# Patient Record
Sex: FEMALE | NOT HISPANIC OR LATINO | Employment: FULL TIME | URBAN - METROPOLITAN AREA
[De-identification: names, ages, dates, MRNs, and addresses within clinical notes are randomized per-mention and may not be internally consistent; named-entity substitution may affect disease eponyms.]

---

## 2017-12-01 ENCOUNTER — OFFICE VISIT (OUTPATIENT)
Dept: FAMILY MEDICINE | Facility: CLINIC | Age: 42
End: 2017-12-01
Payer: COMMERCIAL

## 2017-12-01 VITALS
HEART RATE: 81 BPM | WEIGHT: 190.2 LBS | TEMPERATURE: 98.2 F | HEIGHT: 64 IN | SYSTOLIC BLOOD PRESSURE: 110 MMHG | DIASTOLIC BLOOD PRESSURE: 78 MMHG | OXYGEN SATURATION: 100 % | BODY MASS INDEX: 32.47 KG/M2

## 2017-12-01 DIAGNOSIS — Z00.00 ROUTINE GENERAL MEDICAL EXAMINATION AT A HEALTH CARE FACILITY: Primary | ICD-10-CM

## 2017-12-01 DIAGNOSIS — Z97.5 IUD (INTRAUTERINE DEVICE) IN PLACE: ICD-10-CM

## 2017-12-01 DIAGNOSIS — Z23 NEED FOR PROPHYLACTIC VACCINATION AND INOCULATION AGAINST INFLUENZA: ICD-10-CM

## 2017-12-01 DIAGNOSIS — Z12.31 ENCOUNTER FOR SCREENING MAMMOGRAM FOR BREAST CANCER: ICD-10-CM

## 2017-12-01 DIAGNOSIS — E55.9 VITAMIN D DEFICIENCY: ICD-10-CM

## 2017-12-01 PROCEDURE — 90471 IMMUNIZATION ADMIN: CPT | Performed by: FAMILY MEDICINE

## 2017-12-01 PROCEDURE — 90686 IIV4 VACC NO PRSV 0.5 ML IM: CPT | Performed by: FAMILY MEDICINE

## 2017-12-01 PROCEDURE — 99396 PREV VISIT EST AGE 40-64: CPT | Mod: 25 | Performed by: FAMILY MEDICINE

## 2017-12-01 PROCEDURE — G0145 SCR C/V CYTO,THINLAYER,RESCR: HCPCS | Performed by: FAMILY MEDICINE

## 2017-12-01 PROCEDURE — 87624 HPV HI-RISK TYP POOLED RSLT: CPT | Performed by: FAMILY MEDICINE

## 2017-12-01 RX ORDER — ERGOCALCIFEROL 1.25 MG/1
50000 CAPSULE, LIQUID FILLED ORAL WEEKLY
Qty: 12 CAPSULE | Refills: 1 | Status: SHIPPED | OUTPATIENT
Start: 2017-12-01 | End: 2017-12-01

## 2017-12-01 RX ORDER — COPPER 313.4 MG/1
1 INTRAUTERINE DEVICE INTRAUTERINE ONCE
Qty: 1 EACH | COMMUNITY
Start: 2017-12-01

## 2017-12-01 RX ORDER — ERGOCALCIFEROL 1.25 MG/1
50000 CAPSULE, LIQUID FILLED ORAL
COMMUNITY
End: 2017-12-01

## 2017-12-01 RX ORDER — ERGOCALCIFEROL 1.25 MG/1
50000 CAPSULE, LIQUID FILLED ORAL WEEKLY
Qty: 12 CAPSULE | Refills: 1 | Status: SHIPPED | OUTPATIENT
Start: 2017-12-01 | End: 2020-02-06

## 2017-12-01 NOTE — NURSING NOTE
Chief Complaint   Patient presents with     Physical       Initial There were no vitals taken for this visit. There is no height or weight on file to calculate BMI.  Medication Reconciliation: complete   Lacey Gupta, CMA

## 2017-12-01 NOTE — PROGRESS NOTES
SUBJECTIVE:   CC: Penelope Childress is an 42 year old woman who presents for preventive health visit.     Healthy Habits:    Do you get at least three servings of calcium containing foods daily (dairy, green leafy vegetables, etc.)? yes    Amount of exercise or daily activities, outside of work: 4 day(s) per week    Problems taking medications regularly No    Medication side effects: No    Have you had an eye exam in the past two years? yes    Do you see a dentist twice per year? yes    Do you have sleep apnea, excessive snoring or daytime drowsiness?yes snoring         Today's PHQ-2 Score:   PHQ-2 ( 1999 Pfizer) 12/1/2017   Q1: Little interest or pleasure in doing things 0   Q2: Feeling down, depressed or hopeless 0   PHQ-2 Score 0       Abuse: Current or Past(Physical, Sexual or Emotional)- No  Do you feel safe in your environment - Yes    Social History   Substance Use Topics     Smoking status: Never Smoker     Smokeless tobacco: Never Used     Alcohol use No     The patient does not drink >3 drinks per day nor >7 drinks per week.    Reviewed orders with patient.  Reviewed health maintenance and updated orders accordingly - Yes  Labs reviewed in EPIC  BP Readings from Last 3 Encounters:   12/01/17 110/78    Wt Readings from Last 3 Encounters:   12/01/17 190 lb 3.2 oz (86.3 kg)                  Patient Active Problem List   Diagnosis     Vitamin D deficiency     IUD (intrauterine device) in place     History reviewed. No pertinent surgical history.    Social History   Substance Use Topics     Smoking status: Never Smoker     Smokeless tobacco: Never Used     Alcohol use No     Family History   Problem Relation Age of Onset     DIABETES Mother      DIABETES Father          Current Outpatient Prescriptions   Medication Sig Dispense Refill     vitamin D (ERGOCALCIFEROL) 40030 UNIT capsule Take 1 capsule (50,000 Units) by mouth once a week 12 capsule 1     paragard intrauterine copper 1 each by Intrauterine route once  "for 1 dose 1 each      No Known Allergies        Patient under age 50, mutual decision reflected in health maintenance.        Pertinent mammograms are reviewed under the imaging tab.  History of abnormal Pap smear: NO - age 30- 65 PAP every 3 years recommended    Reviewed and updated as needed this visit by clinical staffTobacco  Allergies  Meds  Problems  Med Hx  Surg Hx  Fam Hx  Soc Hx          Reviewed and updated as needed this visit by Provider  Allergies  Meds  Problems  Med Hx  Surg Hx  Fam Hx              ROS:  C: NEGATIVE for fever, chills, change in weight  I: NEGATIVE for worrisome rashes, moles or lesions  E: NEGATIVE for vision changes or irritation  ENT: NEGATIVE for ear, mouth and throat problems  R: NEGATIVE for significant cough or SOB  B: NEGATIVE for masses, tenderness or discharge  CV: NEGATIVE for chest pain, palpitations or peripheral edema  GI: NEGATIVE for nausea, abdominal pain, heartburn, or change in bowel habits  : NEGATIVE for unusual urinary or vaginal symptoms. Periods are regular.  M: NEGATIVE for significant arthralgias or myalgia  N: NEGATIVE for weakness, dizziness or paresthesias  P: NEGATIVE for changes in mood or affect    OBJECTIVE:   /78 (BP Location: Right arm, Patient Position: Sitting, Cuff Size: Adult Large)  Pulse 81  Temp 98.2  F (36.8  C) (Tympanic)  Ht 5' 4\" (1.626 m)  Wt 190 lb 3.2 oz (86.3 kg)  LMP 11/26/2017  SpO2 100%  BMI 32.65 kg/m2  EXAM:  GENERAL: healthy, alert and no distress  EYES: Eyes grossly normal to inspection, PERRL and conjunctivae and sclerae normal  HENT: ear canals and TM's normal, nose and mouth without ulcers or lesions  NECK: no adenopathy, no asymmetry, masses, or scars and thyroid normal to palpation  RESP: lungs clear to auscultation - no rales, rhonchi or wheezes  BREAST: normal without masses, tenderness or nipple discharge and no palpable axillary masses or adenopathy  CV: regular rate and rhythm, normal S1 S2, " no S3 or S4, no murmur, click or rub, no peripheral edema and peripheral pulses strong  ABDOMEN: soft, nontender, no hepatosplenomegaly, no masses and bowel sounds normal   (female): normal female external genitalia, normal urethral meatus, vaginal mucosa pink, moist, well rugated, and normal cervix/adnexa/uterus without masses or discharge. IUD stings noted at the os.  MS: no gross musculoskeletal defects noted, no edema  SKIN: no suspicious lesions or rashes  NEURO: Normal strength and tone, mentation intact and speech normal  PSYCH: mentation appears normal, affect normal/bright        ASSESSMENT/PLAN:   1. Routine general medical examination at a health care facility  Labs reviewed  -Kidney function (GFR) is normal.  -Sodium is normal.  -Potassium is normal.  -Glucose is slight elevated. ADVISE:: low carbohydrate diet, exercise, try to lose weight (if necessary) and recheck glucose in 12 months.     Your LDL cholesterol is flagged as high. However, this is a cut off only for people with chronic health conditions like diabetes, heart disease. For other individuals the goals for LDL cholesterol are higher, so I am happy with where your LDL is.    ADVICE: The mediterranean diet has been shown to reduce LDL cholesterol and increase HDL cholesterol and has also been shown to reduce the risk of cardiovascular disease, cancer, and Alzheimer's dementia. This diet focuses on healthy fats (monounsatruated and polyunsaturated) such that are found in fish, extra virgin olive oil, nuts, and avocados. It is also high in fruits & vegetables (7 servings per day) and whole grains. Regular exercise is also important in reducing cholesterol. Repeat cholesterol panel should be checked in 1 year again.    -TSH (thyroid stimulating hormone) level is normal which indicates normal thyroid function.  -Hemoglobin is normal.  There is no evidence of anemia.    -Vitamin D level is towards the low normal range and oral supplementation  "should be started.  ADVISE: starting over the counter Vitamin D3  2000 IU - 3 tabs (6000 IU) daily for 6 weeks and then 2000 IU daily to maintain levels.        - TSH with free T4 reflex; Future  - Basic metabolic panel; Future  - Lipid Profile; Future  - Hemoglobin; Future  - Pap imaged thin layer screen with HPV - recommended age 30 - 65 years (select HPV order below)  - HPV High Risk Types DNA Cervical    2. Vitamin D deficiency    - vitamin D (ERGOCALCIFEROL) 28640 UNIT capsule; Take 1 capsule (50,000 Units) by mouth once a week  Dispense: 12 capsule; Refill: 1  - Vitamin D Deficiency; Future    3. IUD (intrauterine device) in place      4. Encounter for screening mammogram for breast cancer    - *MA Screening Digital Bilateral; Future    5. Need for prophylactic vaccination and inoculation against influenza    - FLU VAC, SPLIT VIRUS IM > 3 YO (QUADRIVALENT) [81136]  - Vaccine Administration, Initial [03526]    COUNSELING:   Reviewed preventive health counseling, as reflected in patient instructions       Regular exercise       Healthy diet/nutrition         reports that she has never smoked. She has never used smokeless tobacco.    Estimated body mass index is 32.65 kg/(m^2) as calculated from the following:    Height as of this encounter: 5' 4\" (1.626 m).    Weight as of this encounter: 190 lb 3.2 oz (86.3 kg).   Weight management plan: Discussed healthy diet and exercise guidelines and patient will follow up in 12 months in clinic to re-evaluate.    Counseling Resources:  ATP IV Guidelines  Pooled Cohorts Equation Calculator  Breast Cancer Risk Calculator  FRAX Risk Assessment  ICSI Preventive Guidelines  Dietary Guidelines for Americans, 2010  USDA's MyPlate  ASA Prophylaxis  Lung CA Screening    Laila Galicia MD  [facility] [place]  Injectable Influenza Immunization Documentation    1.  Is the person to be vaccinated sick today?   No    2. Does the person to be vaccinated have an allergy to a " component   of the vaccine?   No  Egg Allergy Algorithm Link    3. Has the person to be vaccinated ever had a serious reaction   to influenza vaccine in the past?   No    4. Has the person to be vaccinated ever had Guillain-Barré syndrome?   No    Form completed by Lacey Gupta [facility]

## 2017-12-01 NOTE — LETTER
December 12, 2017    Penelope Childress  [address]   [place] [address]    Dear Penelope,  We are happy to inform you that your PAP smear result from 12/1/17 is normal.  We are now able to do a follow up test on PAP smears. The DNA test is for HPV (Human Papilloma Virus). Cervical cancer is closely linked with certain types of HPV. Your result showed no evidence of high risk HPV.  Therefore we recommend you return in 3 years for your next pap smear.  You will still need to return to the clinic every year for an annual exam and other preventive tests.  Please contact the clinic at [phone] with any questions.  Sincerely,    Laila Galicia MD/eleni

## 2017-12-01 NOTE — MR AVS SNAPSHOT
After Visit Summary   12/1/2017    Penelope Childress    MRN: [id number]           Patient Information     Date Of Birth          1975        Visit Information        Provider Department      12/1/2017 11:20 AM Laila Galicia MD [facility] [place]        Today's Diagnoses     Routine general medical examination at a health care facility    -  1    Vitamin D deficiency        IUD (intrauterine device) in place        Encounter for screening mammogram for breast cancer        Need for prophylactic vaccination and inoculation against influenza          Care Instructions      Preventive Health Recommendations  Female Ages 40 to 49    Yearly exam:     See your health care provider every year in order to  1. Review health changes.   2. Discuss preventive care.    3. Review your medicines if your doctor prescribed any.      Get a Pap test every three years (unless you have an abnormal result and your provider advises testing more often).      If you get Pap tests with HPV test, you only need to test every 5 years, unless you have an abnormal result. You do not need a Pap test if your uterus was removed (hysterectomy) and you have not had cancer.      You should be tested each year for STDs (sexually transmitted diseases), if you're at risk.       Ask your doctor if you should have a mammogram.      Have a colonoscopy (test for colon cancer) if someone in your family has had colon cancer or polyps before age 50.       Have a cholesterol test every 5 years.       Have a diabetes test (fasting glucose) after age 45. If you are at risk for diabetes, you should have this test every 3 years.    Shots: Get a flu shot each year. Get a tetanus shot every 10 years.     Nutrition:     Eat at least 5 servings of fruits and vegetables each day.    Eat whole-grain bread, whole-wheat pasta and brown rice instead of white grains and rice.    Talk to your provider about Calcium and Vitamin D.  "    Lifestyle    Exercise at least 150 minutes a week (an average of 30 minutes a day, 5 days a week). This will help you control your weight and prevent disease.    Limit alcohol to one drink per day.    No smoking.     Wear sunscreen to prevent skin cancer.    See your dentist every six months for an exam and cleaning.          Follow-ups after your visit        Follow-up notes from your care team     Return in about 3 days (around 2017) for Fasting labs only visit.      Who to contact     If you have questions or need follow up information about today's clinic visit or your schedule please contact [facility] [place] directly at [phone].  Normal or non-critical lab and imaging results will be communicated to you by MyChart, letter or phone within 4 business days after the clinic has received the results. If you do not hear from us within 7 days, please contact the clinic through MyChart or phone. If you have a critical or abnormal lab result, we will notify you by phone as soon as possible.  Submit refill requests through [email or web address] or call your pharmacy and they will forward the refill request to us. Please allow 3 business days for your refill to be completed.          Additional Information About Your Visit        MyChart Information     [email or web address] lets you send messages to your doctor, view your test results, renew your prescriptions, schedule appointments and more. To sign up, go to [email or web address] . Click on \"Log in\" on the left side of the screen, which will take you to the Welcome page. Then click on \"Sign up Now\" on the right side of the page.     You will be asked to enter the access code listed below, as well as some personal information. Please follow the directions to create your username and password.     Your access code is: [email or web address]  Expires: 3/6/2018 11:04 AM     Your access code will  in 90 days. If you need help or a new code, please call your [place] clinic or " "[phone].        Care EveryWhere ID     This is your Care EveryWhere ID. This could be used by other organizations to access your [facility] medical records  [id number]        Your Vitals Were     Pulse Temperature Height Last Period Pulse Oximetry BMI (Body Mass Index)    81 98.2  F (36.8  C) (Tympanic) 5' 4\" (1.626 m) 11/26/2017 100% 32.65 kg/m2       Blood Pressure from Last 3 Encounters:   12/01/17 110/78    Weight from Last 3 Encounters:   12/01/17 190 lb 3.2 oz (86.3 kg)              We Performed the Following     FLU VAC, SPLIT VIRUS IM > 3 YO (QUADRIVALENT) [28310]     HPV High Risk Types DNA Cervical     Pap imaged thin layer screen with HPV - recommended age 30 - 65 years (select HPV order below)     Vaccine Administration, Initial [87937]          Today's Medication Changes          These changes are accurate as of: 12/1/17 11:59 PM.  If you have any questions, ask your nurse or doctor.               Start taking these medicines.        Dose/Directions    vitamin D 41282 UNIT capsule   Commonly known as:  ERGOCALCIFEROL   Used for:  Vitamin D deficiency   Started by:  Laila Galicia MD        Dose:  44536 Units   Take 1 capsule (50,000 Units) by mouth once a week   Quantity:  12 capsule   Refills:  1            Where to get your medicines      These medications were sent to [facility] [address]  [address] [facility] [address]     Phone:  [phone]     vitamin D 73390 UNIT capsule                Primary Care Provider Fax #    Provider Not In System [phone]                Equal Access to Services     LORRAINE TRUJILLO AH: Hadii jerald pak Soradha, waaxda luqadaha, qaybta kaalmada rhonda maher. So [facility] [phone].    ATENCIÓN: Si habla español, tiene a youngblood disposición servicios gratuitos de asistencia lingüística. Llame al [phone].    We comply with applicable federal civil rights " laws and [place] laws. We do not discriminate on the basis of race, color, national origin, age, disability, sex, sexual orientation, or gender identity.            Thank you!     Thank you for choosing [facility] [place]  for your care. Our goal is always to provide you with excellent care. Hearing back from our patients is one way we can continue to improve our services. Please take a few minutes to complete the written survey that you may receive in the mail after your visit with us. Thank you!             Your Updated Medication List - Protect others around you: Learn how to safely use, store and throw away your medicines at www.disposemymeds.org.          This list is accurate as of: 12/1/17 11:59 PM.  Always use your most recent med list.                   Brand Name Dispense Instructions for use Diagnosis    paragard intrauterine copper     1 each    1 each by Intrauterine route once for 1 dose        vitamin D 26895 UNIT capsule    ERGOCALCIFEROL    12 capsule    Take 1 capsule (50,000 Units) by mouth once a week    Vitamin D deficiency

## 2017-12-04 DIAGNOSIS — E55.9 VITAMIN D DEFICIENCY: ICD-10-CM

## 2017-12-04 DIAGNOSIS — Z00.00 ROUTINE GENERAL MEDICAL EXAMINATION AT A HEALTH CARE FACILITY: ICD-10-CM

## 2017-12-04 LAB
ANION GAP SERPL CALCULATED.3IONS-SCNC: 11 MMOL/L (ref 3–14)
BUN SERPL-MCNC: 9 MG/DL (ref 7–30)
CALCIUM SERPL-MCNC: 9.3 MG/DL (ref 8.5–10.1)
CHLORIDE SERPL-SCNC: 108 MMOL/L (ref 94–109)
CHOLEST SERPL-MCNC: 163 MG/DL
CO2 SERPL-SCNC: 20 MMOL/L (ref 20–32)
CREAT SERPL-MCNC: 0.63 MG/DL (ref 0.52–1.04)
DEPRECATED CALCIDIOL+CALCIFEROL SERPL-MC: 24 UG/L (ref 20–75)
GFR SERPL CREATININE-BSD FRML MDRD: >90 ML/MIN/1.7M2
GLUCOSE SERPL-MCNC: 103 MG/DL (ref 70–99)
HDLC SERPL-MCNC: 42 MG/DL
HGB BLD-MCNC: 12.9 G/DL (ref 11.7–15.7)
LDLC SERPL CALC-MCNC: 100 MG/DL
NONHDLC SERPL-MCNC: 121 MG/DL
POTASSIUM SERPL-SCNC: 3.8 MMOL/L (ref 3.4–5.3)
SODIUM SERPL-SCNC: 139 MMOL/L (ref 133–144)
TRIGL SERPL-MCNC: 106 MG/DL
TSH SERPL DL<=0.005 MIU/L-ACNC: 2.55 MU/L (ref 0.4–4)

## 2017-12-04 PROCEDURE — 80061 LIPID PANEL: CPT | Performed by: FAMILY MEDICINE

## 2017-12-04 PROCEDURE — 85018 HEMOGLOBIN: CPT | Performed by: FAMILY MEDICINE

## 2017-12-04 PROCEDURE — 82306 VITAMIN D 25 HYDROXY: CPT | Performed by: FAMILY MEDICINE

## 2017-12-04 PROCEDURE — 80048 BASIC METABOLIC PNL TOTAL CA: CPT | Performed by: FAMILY MEDICINE

## 2017-12-04 PROCEDURE — 36415 COLL VENOUS BLD VENIPUNCTURE: CPT | Performed by: FAMILY MEDICINE

## 2017-12-04 PROCEDURE — 84443 ASSAY THYROID STIM HORMONE: CPT | Performed by: FAMILY MEDICINE

## 2017-12-05 LAB
COPATH REPORT: NORMAL
PAP: NORMAL

## 2017-12-06 LAB
FINAL DIAGNOSIS: NORMAL
HPV HR 12 DNA CVX QL NAA+PROBE: NEGATIVE
HPV16 DNA SPEC QL NAA+PROBE: NEGATIVE
HPV18 DNA SPEC QL NAA+PROBE: NEGATIVE
SPECIMEN DESCRIPTION: NORMAL

## 2017-12-13 ENCOUNTER — TELEPHONE (OUTPATIENT)
Dept: FAMILY MEDICINE | Facility: CLINIC | Age: 42
End: 2017-12-13

## 2017-12-13 DIAGNOSIS — L65.9 HAIR LOSS: Primary | ICD-10-CM

## 2017-12-13 NOTE — TELEPHONE ENCOUNTER
I would like her to see dermatology for management of hair loss. Referral placed.    Laila Galicia MD  [facility] [place]

## 2017-12-13 NOTE — TELEPHONE ENCOUNTER
Dr Galicia, I went over Penelope's labs. She was really concerned about the mild elevations but now she is comfortable rechecking next year.  She said she has a bald spot on her head that she is concerned about and she is wondering what the next steps are since her TSH is normal  Samantha Black RN- Triage [employer]

## 2017-12-19 ENCOUNTER — RADIANT APPOINTMENT (OUTPATIENT)
Dept: MAMMOGRAPHY | Facility: CLINIC | Age: 42
End: 2017-12-19
Attending: FAMILY MEDICINE
Payer: COMMERCIAL

## 2017-12-19 DIAGNOSIS — Z12.31 ENCOUNTER FOR SCREENING MAMMOGRAM FOR BREAST CANCER: ICD-10-CM

## 2017-12-19 PROCEDURE — G0202 SCR MAMMO BI INCL CAD: HCPCS | Mod: TC

## 2018-04-23 ENCOUNTER — TELEPHONE (OUTPATIENT)
Dept: OTHER | Facility: CLINIC | Age: 43
End: 2018-04-23

## 2018-04-23 NOTE — TELEPHONE ENCOUNTER
4/23/2018    Call Regarding Onboarding [facility]     Attempt 1    Message on voicemail     Comments:       Outreach   sb

## 2018-05-15 NOTE — TELEPHONE ENCOUNTER
5/15/2018    Call Regarding Onboarding [facility]ices    Attempt 2    Message on voicemail     Comments: 1 adult      Outreach   CC

## 2018-06-11 ENCOUNTER — TELEPHONE (OUTPATIENT)
Dept: OTHER | Facility: CLINIC | Age: 43
End: 2018-06-11

## 2018-12-31 ENCOUNTER — OFFICE VISIT (OUTPATIENT)
Dept: FAMILY MEDICINE | Facility: CLINIC | Age: 43
End: 2018-12-31
Payer: COMMERCIAL

## 2018-12-31 VITALS
BODY MASS INDEX: 33.97 KG/M2 | TEMPERATURE: 96.1 F | DIASTOLIC BLOOD PRESSURE: 71 MMHG | OXYGEN SATURATION: 99 % | WEIGHT: 199 LBS | RESPIRATION RATE: 12 BRPM | HEART RATE: 78 BPM | HEIGHT: 64 IN | SYSTOLIC BLOOD PRESSURE: 102 MMHG

## 2018-12-31 DIAGNOSIS — Z00.00 ENCOUNTER FOR ROUTINE ADULT HEALTH EXAMINATION WITHOUT ABNORMAL FINDINGS: Primary | ICD-10-CM

## 2018-12-31 DIAGNOSIS — K21.9 GASTROESOPHAGEAL REFLUX DISEASE WITHOUT ESOPHAGITIS: ICD-10-CM

## 2018-12-31 DIAGNOSIS — E66.811 CLASS 1 OBESITY WITH BODY MASS INDEX (BMI) OF 34.0 TO 34.9 IN ADULT, UNSPECIFIED OBESITY TYPE, UNSPECIFIED WHETHER SERIOUS COMORBIDITY PRESENT: ICD-10-CM

## 2018-12-31 DIAGNOSIS — Z97.5 IUD (INTRAUTERINE DEVICE) IN PLACE: ICD-10-CM

## 2018-12-31 DIAGNOSIS — Z13.6 CARDIOVASCULAR SCREENING; LDL GOAL LESS THAN 100: ICD-10-CM

## 2018-12-31 DIAGNOSIS — E55.9 VITAMIN D DEFICIENCY: ICD-10-CM

## 2018-12-31 DIAGNOSIS — L30.9 DERMATITIS: ICD-10-CM

## 2018-12-31 LAB
ALBUMIN SERPL-MCNC: 3.7 G/DL (ref 3.4–5)
ALP SERPL-CCNC: 59 U/L (ref 40–150)
ALT SERPL W P-5'-P-CCNC: 23 U/L (ref 0–50)
ANION GAP SERPL CALCULATED.3IONS-SCNC: 6 MMOL/L (ref 3–14)
AST SERPL W P-5'-P-CCNC: 19 U/L (ref 0–45)
BILIRUB SERPL-MCNC: 0.4 MG/DL (ref 0.2–1.3)
BUN SERPL-MCNC: 11 MG/DL (ref 7–30)
CALCIUM SERPL-MCNC: 8.7 MG/DL (ref 8.5–10.1)
CHLORIDE SERPL-SCNC: 105 MMOL/L (ref 94–109)
CHOLEST SERPL-MCNC: 148 MG/DL
CO2 SERPL-SCNC: 25 MMOL/L (ref 20–32)
CREAT SERPL-MCNC: 0.72 MG/DL (ref 0.52–1.04)
GFR SERPL CREATININE-BSD FRML MDRD: >90 ML/MIN/{1.73_M2}
GLUCOSE SERPL-MCNC: 94 MG/DL (ref 70–99)
HDLC SERPL-MCNC: 44 MG/DL
LDLC SERPL CALC-MCNC: 84 MG/DL
NONHDLC SERPL-MCNC: 104 MG/DL
POTASSIUM SERPL-SCNC: 4.1 MMOL/L (ref 3.4–5.3)
PROT SERPL-MCNC: 7.5 G/DL (ref 6.8–8.8)
SODIUM SERPL-SCNC: 136 MMOL/L (ref 133–144)
TRIGL SERPL-MCNC: 102 MG/DL

## 2018-12-31 PROCEDURE — 99213 OFFICE O/P EST LOW 20 MIN: CPT | Mod: 25 | Performed by: PHYSICIAN ASSISTANT

## 2018-12-31 PROCEDURE — 36415 COLL VENOUS BLD VENIPUNCTURE: CPT | Performed by: PHYSICIAN ASSISTANT

## 2018-12-31 PROCEDURE — 90715 TDAP VACCINE 7 YRS/> IM: CPT | Performed by: PHYSICIAN ASSISTANT

## 2018-12-31 PROCEDURE — 84443 ASSAY THYROID STIM HORMONE: CPT | Performed by: PHYSICIAN ASSISTANT

## 2018-12-31 PROCEDURE — 90471 IMMUNIZATION ADMIN: CPT | Performed by: PHYSICIAN ASSISTANT

## 2018-12-31 PROCEDURE — 80053 COMPREHEN METABOLIC PANEL: CPT | Performed by: PHYSICIAN ASSISTANT

## 2018-12-31 PROCEDURE — 80061 LIPID PANEL: CPT | Performed by: PHYSICIAN ASSISTANT

## 2018-12-31 PROCEDURE — 82306 VITAMIN D 25 HYDROXY: CPT | Performed by: PHYSICIAN ASSISTANT

## 2018-12-31 PROCEDURE — 99396 PREV VISIT EST AGE 40-64: CPT | Mod: 25 | Performed by: PHYSICIAN ASSISTANT

## 2018-12-31 RX ORDER — TRIAMCINOLONE ACETONIDE 5 MG/G
CREAM TOPICAL 2 TIMES DAILY
Qty: 15 G | Refills: 1 | Status: SHIPPED | OUTPATIENT
Start: 2018-12-31 | End: 2020-01-23

## 2018-12-31 ASSESSMENT — MIFFLIN-ST. JEOR: SCORE: 1542.66

## 2018-12-31 NOTE — PATIENT INSTRUCTIONS
Preventive Health Recommendations  Female Ages 40 to 49    Yearly exam:     See your health care provider every year in order to  1. Review health changes.   2. Discuss preventive care.    3. Review your medicines if your doctor prescribed any.      Get a Pap test every three years (unless you have an abnormal result and your provider advises testing more often).      If you get Pap tests with HPV test, you only need to test every 5 years, unless you have an abnormal result. You do not need a Pap test if your uterus was removed (hysterectomy) and you have not had cancer.      You should be tested each year for STDs (sexually transmitted diseases), if you're at risk.     Ask your doctor if you should have a mammogram.      Have a colonoscopy (test for colon cancer) if someone in your family has had colon cancer or polyps before age 50.       Have a cholesterol test every 5 years.       Have a diabetes test (fasting glucose) after age 45. If you are at risk for diabetes, you should have this test every 3 years.    Shots: Get a flu shot each year. Get a tetanus shot every 10 years.     Nutrition:     Eat at least 5 servings of fruits and vegetables each day.    Eat whole-grain bread, whole-wheat pasta and brown rice instead of white grains and rice.    Get adequate Calcium and Vitamin D.      Lifestyle    Exercise at least 150 minutes a week (an average of 30 minutes a day, 5 days a week). This will help you control your weight and prevent disease.    Limit alcohol to one drink per day.    No smoking.     Wear sunscreen to prevent skin cancer.    See your dentist every six months for an exam and cleaning.      Aquaphor: three times daily

## 2018-12-31 NOTE — PROGRESS NOTES
Screening Questionnaire for Adult Immunization    Are you sick today?   No   Do you have allergies to medications, food, a vaccine component or latex?   No   Have you ever had a serious reaction after receiving a vaccination?   No   Do you have a long-term health problem with heart disease, lung disease, asthma, kidney disease, metabolic disease (e.g. diabetes), anemia, or other blood disorder?   No   Do you have cancer, leukemia, HIV/AIDS, or any other immune system problem?   No   In the past 3 months, have you taken medications that affect  your immune system, such as prednisone, other steroids, or anticancer drugs; drugs for the treatment of rheumatoid arthritis, Crohn s disease, or psoriasis; or have you had radiation treatments?   No   Have you had a seizure, or a brain or other nervous system problem?   No   During the past year, have you received a transfusion of blood or blood     products, or been given immune (gamma) globulin or antiviral drug?   No   For women: Are you pregnant or is there a chance you could become        pregnant during the next month?   No   Have you received any vaccinations in the past 4 weeks?   No     Immunization questionnaire answers were all negative.        Per orders of Dr. South, injection of Tdap given by Izabela Briggs. Patient instructed to remain in clinic for 15 minutes afterwards, and to report any adverse reaction to me immediately.       Screening performed by Izabela Briggs on 12/31/2018 at 10:06 AM.     SUBJECTIVE:   CC: Penelope Childress is an 43 year old woman who presents for preventive health visit.     Healthy Habits:    Do you get at least three servings of calcium containing foods daily (dairy, green leafy vegetables, etc.)? yes    Amount of exercise or daily activities, outside of work: 5 day(s) per week    Problems taking medications regularly No    Medication side effects: No    Have you had an eye exam in the past two years? yes    Do you see a dentist twice  per year? no    Do you have sleep apnea, excessive snoring or daytime drowsiness?no      Weight Problem         Description (location/character/radiation): weight problem            Penelope is feeling frustrated that she has not been losing weight.  She has been trying to follow a healthy diet , finding it difficult to lose weight.  She is walking frequently, not doing any other formal exercise. She was on weight loss supplements from the [employer] food store but these caused nausea and so she stopped.     Dermatitis:  Has some hyperpigmented skin thickening and itching on her left shin x 2 weeks, this seems to flare from time to time.     GERD: Intermittent symptoms of reflux, using zantac and PRN prilosec which is helping, would like further recommendations.     Today's PHQ-2 Score:   PHQ-2 ( 1999 Pf[employer]) 12/31/2018 12/1/2017   Q1: Little interest or pleasure in doing things 0 0   Q2: Feeling down, depressed or hopeless 0 0   PHQ-2 Score 0 0       Abuse: Current or Past(Physical, Sexual or Emotional)- No  Do you feel safe in your environment? Yes    Social History     Tobacco Use     Smoking status: Never Smoker     Smokeless tobacco: Never Used   Substance Use Topics     Alcohol use: No     If you drink alcohol do you typically have >3 drinks per day or >7 drinks per week? No                     Reviewed orders with patient.  Reviewed health maintenance and updated orders accordingly - Yes  Patient Active Problem List   Diagnosis     Vitamin D deficiency     IUD (intrauterine device) in place     CARDIOVASCULAR SCREENING; LDL GOAL LESS THAN 100     Class 1 obesity with body mass index (BMI) of 34.0 to 34.9 in adult, unspecified obesity type, unspecified whether serious comorbidity present     Past Surgical History:   Procedure Laterality Date     NO HISTORY OF SURGERY         Social History     Tobacco Use     Smoking status: Never Smoker     Smokeless tobacco: Never Used   Substance Use Topics     Alcohol use: No      Family History   Problem Relation Age of Onset     Diabetes Mother      Diabetes Father          Current Outpatient Medications   Medication Sig Dispense Refill     paragard intrauterine copper 1 each by Intrauterine route once for 1 dose 1 each      triamcinolone (KENALOG) 0.5 % external cream Apply topically 2 times daily 15 g 1     vitamin D (ERGOCALCIFEROL) 59141 UNIT capsule Take 1 capsule (50,000 Units) by mouth once a week (Patient not taking: Reported on 2018) 12 capsule 1     No Known Allergies  Recent Labs   Lab Test 17  0931   *   HDL 42*   TRIG 106   CR 0.63   GFRESTIMATED >90   GFRESTBLACK >90   POTASSIUM 3.8   TSH 2.55        Mammogram not appropriate for this patient based on age.  Mammogram Screening: Patient under age 50, mutual decision reflected in health maintenance.      Pertinent mammograms are reviewed under the imaging tab.  History of abnormal Pap smear: NO - age 30- 65 PAP every 3 years recommended  PAP / HPV Latest Ref Rng & Units 2017   PAP - NIL   HPV 16 DNA NEG:Negative Negative   HPV 18 DNA NEG:Negative Negative   OTHER HR HPV NEG:Negative Negative     Reviewed and updated as needed this visit by clinical staff  Tobacco  Allergies  Meds  Med Hx  Fam Hx  Soc Hx        Reviewed and updated as needed this visit by Provider  Tobacco  Med Hx  Fam Hx  Soc Hx       Past Medical History:   Diagnosis Date     IUD (intrauterine device) in place 2013    paragard      Past Surgical History:   Procedure Laterality Date     NO HISTORY OF SURGERY       Obstetric History       T0      L2     SAB0   TAB0   Ectopic0   Multiple0   Live Births0       # Outcome Date GA Lbr Tyree/2nd Weight Sex Delivery Anes PTL Lv   2             1                    ROS:  CONSTITUTIONAL: NEGATIVE for fever, chills, change in weight  INTEGUMENTARU/SKIN: NEGATIVE for worrisome rashes, moles or lesions  EYES: NEGATIVE for vision changes or irritation  ENT: NEGATIVE  "for ear, mouth and throat problems  RESP: NEGATIVE for significant cough or SOB  BREAST: NEGATIVE for masses, tenderness or discharge  CV: NEGATIVE for chest pain, palpitations or peripheral edema  GI: See above  : NEGATIVE for unusual urinary or vaginal symptoms. Periods are regular.  MUSCULOSKELETAL: NEGATIVE for significant arthralgias or myalgia  NEURO: NEGATIVE for weakness, dizziness or paresthesias  PSYCHIATRIC: NEGATIVE for changes in mood or affect    OBJECTIVE:   /71 (Cuff Size: Adult Regular)   Pulse 78   Temp 96.1  F (35.6  C) (Tympanic)   Resp 12   Ht 1.626 m (5' 4\")   Wt 90.3 kg (199 lb)   SpO2 99%   BMI 34.16 kg/m    EXAM:  GENERAL: healthy, alert and no distress  EYES: Eyes grossly normal to inspection, PERRL and conjunctivae and sclerae normal  HENT: ear canals and TM's normal, nose and mouth without ulcers or lesions  NECK: no adenopathy, no asymmetry, masses, or scars and thyroid normal to palpation  RESP: lungs clear to auscultation - no rales, rhonchi or wheezes  CV: regular rate and rhythm, normal S1 S2, click or rub, no peripheral edema and peripheral pulses strong  ABDOMEN: soft, nontender, no hepatosplenomegaly, no masses and bowel sounds normal  MS: no gross musculoskeletal defects noted, no edema  SKIN:  4x4 hyperpigmented, scaly thickened plaque on skin on right shin  NEURO: Normal strength and tone, mentation intact and speech normal  PSYCH: mentation appears normal, affect normal/bright    Diagnostic Test Results:  none     ASSESSMENT/PLAN:   1. Encounter for routine adult health examination without abnormal findings  - TSH with free T4 reflex    2. Vitamin D deficiency    3. Class 1 obesity with body mass index (BMI) of 34.0 to 34.9 in adult, unspecified obesity type, unspecified whether serious comorbidity present  discussed exercise recommendation and encouraged more formal exercise for weight loss and to continue diet.  She will follow up in 6 months after being " "consistent with diet and exercise regimen.  If still having trouble, may consider medical weight loss program    4. Dermatitis   Likely Lichen simplex chronicus of skin of shin.  Will try high potency steroid, may need super high dose in the future if no improvement. She will return if needed  - triamcinolone (KENALOG) 0.5 % external cream; Apply topically 2 times daily  Dispense: 15 g; Refill: 1    5. Gastroesophageal reflux disease without esophagitis  Advise that she continue Prilosec x 1 month, reduce spicy and acidic foods in her diet.  Will continue to monitor    6. IUD (intrauterine device) in place    7. CARDIOVASCULAR SCREENING; LDL GOAL LESS THAN 100  - Lipid Profile (Chol, Trig, HDL, LDL calc)  - Comprehensive metabolic panel (BMP + Alb, Alk Phos, ALT, AST, Total. Bili, TP)  - Vitamin D Deficiency    COUNSELING:   Reviewed preventive health counseling, as reflected in patient instructions       Regular exercise       Healthy diet/nutrition       Immunizations    Declined: TDAP due to Cost            BP Readings from Last 1 Encounters:   12/31/18 102/71     Estimated body mass index is 34.16 kg/m  as calculated from the following:    Height as of this encounter: 1.626 m (5' 4\").    Weight as of this encounter: 90.3 kg (199 lb).      Weight management plan: Discussed healthy diet and exercise guidelines     reports that  has never smoked. she has never used smokeless tobacco.      Counseling Resources:  ATP IV Guidelines  Pooled Cohorts Equation Calculator  Breast Cancer Risk Calculator  FRAX Risk Assessment  ICSI Preventive Guidelines  Dietary Guidelines for Americans, 2010  USDA's MyPlate  ASA Prophylaxis  Lung CA Screening    Jay South PA-C  [facility] [place]  "

## 2019-01-04 ENCOUNTER — TELEPHONE (OUTPATIENT)
Dept: FAMILY MEDICINE | Facility: CLINIC | Age: 44
End: 2019-01-04

## 2019-01-04 DIAGNOSIS — E55.9 VITAMIN D DEFICIENCY: Primary | ICD-10-CM

## 2019-01-04 NOTE — TELEPHONE ENCOUNTER
Please call patient with results of her labs .  Her vitamin D is low ( 18).  At this time I would advise that she use 50,000 units of vitamin D ( sent to pharmacy) weekly x 8 weeks and have her vitamin D rechecked after.  Her TSH, and CMP were normal.  HDL is slightly low, diet and exercise can be helpful in improving this    Results for orders placed or performed in visit on 12/31/18   Lipid Profile (Chol, Trig, HDL, LDL calc)   Result Value Ref Range    Cholesterol 148 <200 mg/dL    Triglycerides 102 <150 mg/dL    HDL Cholesterol 44 (L) >49 mg/dL    LDL Cholesterol Calculated 84 <100 mg/dL    Non HDL Cholesterol 104 <130 mg/dL   Comprehensive metabolic panel (BMP + Alb, Alk Phos, ALT, AST, Total. Bili, TP)   Result Value Ref Range    Sodium 136 133 - 144 mmol/L    Potassium 4.1 3.4 - 5.3 mmol/L    Chloride 105 94 - 109 mmol/L    Carbon Dioxide 25 20 - 32 mmol/L    Anion Gap 6 3 - 14 mmol/L    Glucose 94 70 - 99 mg/dL    Urea Nitrogen 11 7 - 30 mg/dL    Creatinine 0.72 0.52 - 1.04 mg/dL    GFR Estimate >90 >60 mL/min/[1.73_m2]    GFR Estimate If Black >90 >60 mL/min/[1.73_m2]    Calcium 8.7 8.5 - 10.1 mg/dL    Bilirubin Total 0.4 0.2 - 1.3 mg/dL    Albumin 3.7 3.4 - 5.0 g/dL    Protein Total 7.5 6.8 - 8.8 g/dL    Alkaline Phosphatase 59 40 - 150 U/L    ALT 23 0 - 50 U/L    AST 19 0 - 45 U/L   Vitamin D Deficiency   Result Value Ref Range    Vitamin D Deficiency screening 18 (L) 20 - 75 ug/L   TSH with free T4 reflex   Result Value Ref Range    TSH 2.59 0.40 - 4.00 mU/L

## 2019-01-04 NOTE — TELEPHONE ENCOUNTER
Telephone Information:   Mobile [phone]     Reviewed Results below with patient:    Please call patient with results of her labs .  Her vitamin D is low ( 18).  At this time I would advise that she use 50,000 units of vitamin D ( sent to pharmacy) weekly x 8 weeks and have her vitamin D rechecked after.  Her TSH, and CMP were normal.  HDL is slightly low, diet and exercise can be helpful in improving this    Patient advised that medication is at the [facility] in [place]. Patient will call back once she compete medication to schedule lab follow up. Patient stated an understanding and agreed with plan.    Alexandra BEEBEN, RN   [facility]

## 2019-09-08 DIAGNOSIS — E55.9 VITAMIN D DEFICIENCY: ICD-10-CM

## 2019-09-09 RX ORDER — METHOCARBAMOL 750 MG/1
TABLET ORAL
Qty: 8 CAPSULE | Refills: 0 | OUTPATIENT
Start: 2019-09-09

## 2019-09-09 NOTE — TELEPHONE ENCOUNTER
"Requested Prescriptions   Pending Prescriptions Disp Refills     D3-50 38826 units capsule [Pharmacy Med Name: VITAMIN D3-50 50,000 UNIT  Last Written Prescription Date:  12-1-2017  Last Fill Quantity: 12 capsule,  # refills: 1   Last office visit: 12/31/2018 with prescribing provider:     Future Office Visit:     CAP] 8 capsule 0     Sig: TAKE ONE CAPSULE BY MOUTH ONE TIME PER WEEK       Vitamin Supplements (Adult) Protocol Failed - 9/8/2019  4:04 PM        Failed - High dose Vitamin D not ordered        Passed - Recent (12 mo) or future (30 days) visit within the authorizing provider's specialty     Patient had office visit in the last 12 months or has a visit in the next 30 days with authorizing provider or within the authorizing provider's specialty.  See \"Patient Info\" tab in inbasket, or \"Choose Columns\" in Meds & Orders section of the refill encounter.              Passed - Medication is active on med list          "

## 2019-09-10 NOTE — TELEPHONE ENCOUNTER
Refill denied.  Labs due to recheck Vitamin D.  Order in chart. Note sent to pharmacy.    ABIEL Wilson, RN, PHN  [facility]) [phone]

## 2020-01-23 ENCOUNTER — OFFICE VISIT (OUTPATIENT)
Dept: FAMILY MEDICINE | Facility: CLINIC | Age: 45
End: 2020-01-23
Payer: COMMERCIAL

## 2020-01-23 VITALS
HEIGHT: 64 IN | HEART RATE: 70 BPM | WEIGHT: 210 LBS | SYSTOLIC BLOOD PRESSURE: 114 MMHG | BODY MASS INDEX: 35.85 KG/M2 | TEMPERATURE: 98.4 F | OXYGEN SATURATION: 100 % | RESPIRATION RATE: 16 BRPM | DIASTOLIC BLOOD PRESSURE: 78 MMHG

## 2020-01-23 DIAGNOSIS — J01.90 ACUTE SINUSITIS WITH SYMPTOMS > 10 DAYS: Primary | ICD-10-CM

## 2020-01-23 PROCEDURE — 99213 OFFICE O/P EST LOW 20 MIN: CPT | Performed by: FAMILY MEDICINE

## 2020-01-23 ASSESSMENT — MIFFLIN-ST. JEOR: SCORE: 1587.55

## 2020-01-23 NOTE — PROGRESS NOTES
Subjective     Penelope Childress is a 44 year old female who presents to clinic today for the following health issues:    HPI   RESPIRATORY SYMPTOMS      Duration: 3 weeks     Description  cough    Severity: moderate    Accompanying signs and symptoms: None    History (predisposing factors):  none    Precipitating or alleviating factors: None    Therapies tried and outcome:  Tylenol, Benadryl, Nyquil, cough OTC medication, Claritin        Patient Active Problem List   Diagnosis     Vitamin D deficiency     IUD (intrauterine device) in place     CARDIOVASCULAR SCREENING; LDL GOAL LESS THAN 100     Class 1 obesity with body mass index (BMI) of 34.0 to 34.9 in adult, unspecified obesity type, unspecified whether serious comorbidity present     Past Surgical History:   Procedure Laterality Date     NO HISTORY OF SURGERY         Social History     Tobacco Use     Smoking status: Never Smoker     Smokeless tobacco: Never Used   Substance Use Topics     Alcohol use: No     Family History   Problem Relation Age of Onset     Diabetes Mother      Diabetes Father          Current Outpatient Medications   Medication Sig Dispense Refill     amoxicillin-clavulanate (AUGMENTIN) 875-125 MG tablet Take 1 tablet by mouth 2 times daily 20 tablet 0     paragard intrauterine copper 1 each by Intrauterine route once for 1 dose 1 each      vitamin D (ERGOCALCIFEROL) 62817 UNIT capsule Take 1 capsule (50,000 Units) by mouth once a week (Patient not taking: Reported on 12/31/2018) 12 capsule 1     No Known Allergies  Recent Labs   Lab Test 12/31/18  1004 12/04/17  0931   LDL 84 100*   HDL 44* 42*   TRIG 102 106   ALT 23  --    CR 0.72 0.63   GFRESTIMATED >90 >90   GFRESTBLACK >90 >90   POTASSIUM 4.1 3.8   TSH 2.59 2.55      BP Readings from Last 3 Encounters:   01/23/20 114/78   12/31/18 102/71   12/01/17 110/78    Wt Readings from Last 3 Encounters:   01/23/20 95.3 kg (210 lb)   12/31/18 90.3 kg (199 lb)   12/01/17 86.3 kg (190 lb 3.2 oz)     "        Reviewed and updated as needed this visit by Provider         Review of Systems   ROS COMP: Constitutional, HEENT, cardiovascular, pulmonary, gi and gu systems are negative, except as otherwise noted.      Objective    /78 (Cuff Size: Adult Large)   Pulse 70   Temp 98.4  F (36.9  C) (Tympanic)   Resp 16   Ht 1.626 m (5' 4\")   Wt 95.3 kg (210 lb)   LMP 01/13/2020 (Approximate)   SpO2 100%   Breastfeeding No   BMI 36.05 kg/m    Body mass index is 36.05 kg/m .  Physical Exam   GENERAL: healthy, alert and no distress  NECK: no adenopathy, no asymmetry, masses, or scars and thyroid normal to palpation  RESP: lungs clear to auscultation - no rales, rhonchi or wheezes  CV: regular rate and rhythm, normal S1 S2, no S3 or S4, no murmur, click or rub, no peripheral edema and peripheral pulses strong  ABDOMEN: soft, nontender, no hepatosplenomegaly, no masses and bowel sounds normal  MS: no gross musculoskeletal defects noted, no edema            Assessment & Plan       ICD-10-CM    1. Acute sinusitis with symptoms > 10 days J01.90 amoxicillin-clavulanate (AUGMENTIN) 875-125 MG tablet        BMI:   Estimated body mass index is 36.05 kg/m  as calculated from the following:    Height as of this encounter: 1.626 m (5' 4\").    Weight as of this encounter: 95.3 kg (210 lb).       No follow-ups on file.    Eran Walter MD  [facility] [place]        "

## 2020-01-27 ENCOUNTER — TELEPHONE (OUTPATIENT)
Dept: FAMILY MEDICINE | Facility: CLINIC | Age: 45
End: 2020-01-27

## 2020-01-27 DIAGNOSIS — J32.9 PURULENT POSTNASAL DRAINAGE: ICD-10-CM

## 2020-01-27 DIAGNOSIS — R05.9 COUGH: Primary | ICD-10-CM

## 2020-01-27 RX ORDER — BENZONATATE 100 MG/1
100 CAPSULE ORAL 3 TIMES DAILY PRN
Qty: 30 CAPSULE | Refills: 1 | Status: SHIPPED | OUTPATIENT
Start: 2020-01-27 | End: 2020-10-06

## 2020-01-27 NOTE — TELEPHONE ENCOUNTER
Her sx might be related with postnasal drip from sinusitis.  I will add nasal spray and tessalon, please let her know   I would recommend her to see us in 2 weeks if her sx not improving

## 2020-01-27 NOTE — TELEPHONE ENCOUNTER
Patient states she was seen 1/23/2020 and treated for sinus infection. She notes she still has a pretty bad cough and last year was given tessalon perles which was very helpful. She is wondering if she can get another rx for that? She also states that she has had no improvement on the 4 days of antibiotics. Wondering when can expect to feel better or if she should be on different antibiotics? Pharmacy pended, please advise.   Ibis Winn RN   [facility] - Triage

## 2020-01-27 NOTE — TELEPHONE ENCOUNTER
S/w pt and gave Dr. Walter's message below.  Pt will go to the pharmacy and  rxs.  Wondering if Dr. Walter changed the abx?  Advised he did not change the abx and to continue with augmentin for now with the addition of the nasal spray and tessalon perrles.    Pt states understanding.    Suzanne LUCERO RN  EP Triage

## 2020-01-27 NOTE — TELEPHONE ENCOUNTER
Reason for call:  Patient reporting a symptom    Symptom or request: The patient is calling saying she saw Dr. Walter on 01/23/2020. She said he never prescribed her something for her cough. She is requesting this now.    Duration (how long have symptoms been present): Ongoing    Have you been treated for this before? Yes    Phone Number patient can be reached at:  Cell number on file:    Telephone Information:   Mobile [phone]     Best Time:  Anytime    Can we leave a detailed message on this number:  YES    Call taken on 1/27/2020 at 12:46 PM by Moira Maciel

## 2020-02-03 ENCOUNTER — TELEPHONE (OUTPATIENT)
Dept: FAMILY MEDICINE | Facility: CLINIC | Age: 45
End: 2020-02-03

## 2020-02-03 NOTE — TELEPHONE ENCOUNTER
Non-detailed message left for patient to return call.  Per telephone encounter from 1/27/2020 per Dr. Walter: I would recommend her to see us in 2 weeks if her sx not improving   ABIEL CruzN, RN  [employer] Triage

## 2020-02-03 NOTE — TELEPHONE ENCOUNTER
Reason for call:  Patient reporting a symptom    Symptom or request: Cough and Sinusitis     Duration (how long have symptoms been present): a couple of weeks     Have you been treated for this before? Yes    Additional comments: Patient is still having these symptoms after finishing antibiotics     Phone Number patient can be reached at:  Home number on file [phone] (home)    Best Time:  Anytime     Can we leave a detailed message on this number:  YES    Call taken on 2/3/2020 at 3:44 PM by Dolores Camacho

## 2020-02-04 NOTE — TELEPHONE ENCOUNTER
Called patient and advised if symptoms are still persistent she needs to follow up here in the clinic per Dr. Walter's note from 1/23/20. Patient is scheduled tomorrow with Dr. Montelongo for her annual physical at 5:40 pm so she will address her symptoms with provider. Patient verbalized understanding and agrees with plan.       Darling Zapata RN, BSN  [facility] [place]

## 2020-02-06 ENCOUNTER — OFFICE VISIT (OUTPATIENT)
Dept: FAMILY MEDICINE | Facility: CLINIC | Age: 45
End: 2020-02-06
Payer: COMMERCIAL

## 2020-02-06 VITALS
WEIGHT: 207 LBS | DIASTOLIC BLOOD PRESSURE: 80 MMHG | BODY MASS INDEX: 38.09 KG/M2 | HEART RATE: 72 BPM | TEMPERATURE: 98.5 F | OXYGEN SATURATION: 100 % | SYSTOLIC BLOOD PRESSURE: 104 MMHG | HEIGHT: 62 IN

## 2020-02-06 DIAGNOSIS — Z13.6 CARDIOVASCULAR SCREENING; LDL GOAL LESS THAN 100: ICD-10-CM

## 2020-02-06 DIAGNOSIS — Z00.00 ROUTINE GENERAL MEDICAL EXAMINATION AT A HEALTH CARE FACILITY: Primary | ICD-10-CM

## 2020-02-06 DIAGNOSIS — J32.9 SINUSITIS, UNSPECIFIED CHRONICITY, UNSPECIFIED LOCATION: ICD-10-CM

## 2020-02-06 PROCEDURE — 99396 PREV VISIT EST AGE 40-64: CPT | Performed by: FAMILY MEDICINE

## 2020-02-06 RX ORDER — FLUTICASONE PROPIONATE 50 MCG
1-2 SPRAY, SUSPENSION (ML) NASAL DAILY
Qty: 11.1 ML | Refills: 0 | Status: SHIPPED | OUTPATIENT
Start: 2020-02-06 | End: 2020-02-28

## 2020-02-06 RX ORDER — FEXOFENADINE HCL AND PSEUDOEPHEDRINE HCL 180; 240 MG/1; MG/1
1 TABLET, EXTENDED RELEASE ORAL DAILY
Qty: 30 TABLET | Refills: 0 | Status: SHIPPED | OUTPATIENT
Start: 2020-02-06 | End: 2020-10-06

## 2020-02-06 ASSESSMENT — MIFFLIN-ST. JEOR: SCORE: 1536.07

## 2020-02-06 NOTE — PROGRESS NOTES
SUBJECTIVE:   CC: Penelope Childress is an 44 year old woman who presents for preventive health visit.     Healthy Habits:    Do you get at least three servings of calcium containing foods daily (dairy, green leafy vegetables, etc.)? yes    Amount of exercise or daily activities, outside of work: NONE    Problems taking medications regularly No    Medication side effects: NA    Have you had an eye exam in the past two years? yes    Do you see a dentist twice per year? no    Do you have sleep apnea, excessive snoring or daytime drowsiness?no      Concern - follow up sinus infection  Finish the course of antibiotic.  Currently complains of congestion.  No fever no chills  Today's PHQ-2 Score:   PHQ-2 ( 1999 Pfizer) 2/6/2020 1/23/2020   Q1: Little interest or pleasure in doing things 0 0   Q2: Feeling down, depressed or hopeless 0 1   PHQ-2 Score 0 1       Abuse: Current or Past(Physical, Sexual or Emotional)- NO  Do you feel safe in your environment? Yes        Social History     Tobacco Use     Smoking status: Never Smoker     Smokeless tobacco: Never Used   Substance Use Topics     Alcohol use: No     If you drink alcohol do you typically have >3 drinks per day or >7 drinks per week? No                     Reviewed orders with patient.  Reviewed health maintenance and updated orders accordingly - Yes  Lab work is in process  Labs reviewed in EPIC    Mammogram not appropriate for this patient based on age.    Pertinent mammograms are reviewed under the imaging tab.  History of abnormal Pap smear: NO - age 30- 65 PAP every 3 years recommended  PAP / HPV Latest Ref Rng & Units 12/1/2017   PAP - NIL   HPV 16 DNA NEG:Negative Negative   HPV 18 DNA NEG:Negative Negative   OTHER HR HPV NEG:Negative Negative     Reviewed and updated as needed this visit by clinical staff  Tobacco  Allergies  Meds  Fam Hx  Soc Hx        Reviewed and updated as needed this visit by Provider            ROS:  CONSTITUTIONAL: NEGATIVE for  "fever, chills, change in weight  INTEGUMENTARU/SKIN: NEGATIVE for worrisome rashes, moles or lesions  EYES: NEGATIVE for vision changes or irritation  ENT: NEGATIVE for ear, mouth and throat problems  RESP: NEGATIVE for significant cough or SOB  BREAST: NEGATIVE for masses, tenderness or discharge  CV: NEGATIVE for chest pain, palpitations or peripheral edema  GI: NEGATIVE for nausea, abdominal pain, heartburn, or change in bowel habits  : NEGATIVE for unusual urinary or vaginal symptoms. Periods are regular.  MUSCULOSKELETAL: NEGATIVE for significant arthralgias or myalgia  NEURO: NEGATIVE for weakness, dizziness or paresthesias  PSYCHIATRIC: NEGATIVE for changes in mood or affect    OBJECTIVE:   /80   Pulse 72   Temp 98.5  F (36.9  C) (Tympanic)   Ht 1.565 m (5' 1.61\")   Wt 93.9 kg (207 lb)   LMP 01/13/2020 (Approximate)   SpO2 100%   BMI 38.34 kg/m    EXAM:  GENERAL: healthy, alert and no distress  EYES: Eyes grossly normal to inspection, PERRL and conjunctivae and sclerae normal  HENT: ear canals and TM's normal, nose and mouth without ulcers or lesions  NECK: no adenopathy, no asymmetry, masses, or scars and thyroid normal to palpation  RESP: lungs clear to auscultation - no rales, rhonchi or wheezes  CV: regular rate and rhythm, normal S1 S2, no S3 or S4, no murmur, click or rub, no peripheral edema and peripheral pulses strong  ABDOMEN: soft, nontender, no hepatosplenomegaly, no masses and bowel sounds normal  MS: no gross musculoskeletal defects noted, no edema  SKIN: no suspicious lesions or rashes  NEURO: Normal strength and tone, mentation intact and speech normal  PSYCH: mentation appears normal, affect normal/bright    Diagnostic Test Results:  Labs reviewed in Epic    ASSESSMENT/PLAN:   1. Routine general medical examination at a health care facility  Is to get lab work done once done we will follow-up on that I would suggest fasting.  - HIV Screening; Future  - Comprehensive metabolic " "panel; Future  - Lipid panel reflex to direct LDL Fasting; Future  - TSH; Future    2. CARDIOVASCULAR SCREENING; LDL GOAL LESS THAN 100    - Comprehensive metabolic panel; Future  - Lipid panel reflex to direct LDL Fasting; Future    3. Sinusitis, unspecified chronicity, unspecified location  Reassured patient she does not need any additional antibiotics.  She can try Allegra-D to help with some congestion symptoms along with Flonase.  No further antibiotic necessary.  - fluticasone (FLONASE) 50 MCG/ACT nasal spray; Spray 1-2 sprays into both nostrils daily  Dispense: 11.1 mL; Refill: 0  - fexofenadine-pseudoePHEDrine (ALLEGRA-D 24) 180-240 MG 24 hr tablet; Take 1 tablet by mouth daily  Dispense: 30 tablet; Refill: 0    COUNSELING:   Reviewed preventive health counseling, as reflected in patient instructions       Healthy diet/nutrition       Vision screening    Estimated body mass index is 38.34 kg/m  as calculated from the following:    Height as of this encounter: 1.565 m (5' 1.61\").    Weight as of this encounter: 93.9 kg (207 lb).         reports that she has never smoked. She has never used smokeless tobacco.      Counseling Resources:  ATP IV Guidelines  Pooled Cohorts Equation Calculator  Breast Cancer Risk Calculator  FRAX Risk Assessment  ICSI Preventive Guidelines  Dietary Guidelines for Americans, 2010  USDA's MyPlate  ASA Prophylaxis  Lung CA Screening    Erasmo Barron MD  [facility] [place]  "

## 2020-02-07 DIAGNOSIS — Z00.00 ROUTINE GENERAL MEDICAL EXAMINATION AT A HEALTH CARE FACILITY: ICD-10-CM

## 2020-02-07 DIAGNOSIS — Z13.6 CARDIOVASCULAR SCREENING; LDL GOAL LESS THAN 100: ICD-10-CM

## 2020-02-07 LAB
ALBUMIN SERPL-MCNC: 3.9 G/DL (ref 3.4–5)
ALP SERPL-CCNC: 58 U/L (ref 40–150)
ALT SERPL W P-5'-P-CCNC: 22 U/L (ref 0–50)
ANION GAP SERPL CALCULATED.3IONS-SCNC: 5 MMOL/L (ref 3–14)
AST SERPL W P-5'-P-CCNC: 14 U/L (ref 0–45)
BILIRUB SERPL-MCNC: 0.4 MG/DL (ref 0.2–1.3)
BUN SERPL-MCNC: 6 MG/DL (ref 7–30)
CALCIUM SERPL-MCNC: 8.7 MG/DL (ref 8.5–10.1)
CHLORIDE SERPL-SCNC: 109 MMOL/L (ref 94–109)
CHOLEST SERPL-MCNC: 166 MG/DL
CO2 SERPL-SCNC: 23 MMOL/L (ref 20–32)
CREAT SERPL-MCNC: 0.69 MG/DL (ref 0.52–1.04)
GFR SERPL CREATININE-BSD FRML MDRD: >90 ML/MIN/{1.73_M2}
GLUCOSE SERPL-MCNC: 94 MG/DL (ref 70–99)
HDLC SERPL-MCNC: 41 MG/DL
LDLC SERPL CALC-MCNC: 104 MG/DL
NONHDLC SERPL-MCNC: 125 MG/DL
POTASSIUM SERPL-SCNC: 4.2 MMOL/L (ref 3.4–5.3)
PROT SERPL-MCNC: 7.5 G/DL (ref 6.8–8.8)
SODIUM SERPL-SCNC: 137 MMOL/L (ref 133–144)
TRIGL SERPL-MCNC: 103 MG/DL
TSH SERPL DL<=0.005 MIU/L-ACNC: 2.95 MU/L (ref 0.4–4)

## 2020-02-07 PROCEDURE — 80061 LIPID PANEL: CPT | Performed by: FAMILY MEDICINE

## 2020-02-07 PROCEDURE — 82306 VITAMIN D 25 HYDROXY: CPT | Performed by: FAMILY MEDICINE

## 2020-02-07 PROCEDURE — 84443 ASSAY THYROID STIM HORMONE: CPT | Performed by: FAMILY MEDICINE

## 2020-02-07 PROCEDURE — 87389 HIV-1 AG W/HIV-1&-2 AB AG IA: CPT | Performed by: FAMILY MEDICINE

## 2020-02-07 PROCEDURE — 36415 COLL VENOUS BLD VENIPUNCTURE: CPT | Performed by: FAMILY MEDICINE

## 2020-02-07 PROCEDURE — 80053 COMPREHEN METABOLIC PANEL: CPT | Performed by: FAMILY MEDICINE

## 2020-02-10 LAB
DEPRECATED CALCIDIOL+CALCIFEROL SERPL-MC: 20 UG/L (ref 20–75)
HIV 1+2 AB+HIV1 P24 AG SERPL QL IA: NONREACTIVE

## 2020-05-10 DIAGNOSIS — J32.9 SINUSITIS, UNSPECIFIED CHRONICITY, UNSPECIFIED LOCATION: ICD-10-CM

## 2020-05-11 RX ORDER — FLUTICASONE PROPIONATE 50 MCG
1-2 SPRAY, SUSPENSION (ML) NASAL DAILY
Qty: 16 ML | Refills: 5 | Status: SHIPPED | OUTPATIENT
Start: 2020-05-11 | End: 2021-07-28

## 2020-05-11 NOTE — TELEPHONE ENCOUNTER
Prescription approved per [facility].  Ibis Winn RN   [facility] - Triage

## 2020-06-03 ENCOUNTER — VIRTUAL VISIT (OUTPATIENT)
Dept: FAMILY MEDICINE | Facility: CLINIC | Age: 45
End: 2020-06-03
Payer: COMMERCIAL

## 2020-06-03 DIAGNOSIS — R19.7 VOMITING AND DIARRHEA: ICD-10-CM

## 2020-06-03 DIAGNOSIS — R11.0 NAUSEA: ICD-10-CM

## 2020-06-03 DIAGNOSIS — K31.89 STOMACH SPASM: Primary | ICD-10-CM

## 2020-06-03 DIAGNOSIS — R11.10 VOMITING AND DIARRHEA: ICD-10-CM

## 2020-06-03 PROCEDURE — 99214 OFFICE O/P EST MOD 30 MIN: CPT | Mod: 95 | Performed by: FAMILY MEDICINE

## 2020-06-03 RX ORDER — DICYCLOMINE HYDROCHLORIDE 10 MG/1
10 CAPSULE ORAL 3 TIMES DAILY PRN
Qty: 21 CAPSULE | Refills: 0 | Status: SHIPPED | OUTPATIENT
Start: 2020-06-03 | End: 2020-06-12

## 2020-06-03 RX ORDER — PROCHLORPERAZINE MALEATE 10 MG
10 TABLET ORAL EVERY 8 HOURS PRN
Qty: 21 TABLET | Refills: 0 | Status: SHIPPED | OUTPATIENT
Start: 2020-06-03 | End: 2020-10-06

## 2020-06-03 NOTE — PROGRESS NOTES
"Penelope Childress is a 44 year old female who is being evaluated via a billable video visit.      The patient has been notified of following:     \"This video visit will be conducted via a call between you and your physician/provider. We have found that certain health care needs can be provided without the need for an in-person physical exam.  This service lets us provide the care you need with a video conversation.  If a prescription is necessary we can send it directly to your pharmacy.  If lab work is needed we can place an order for that and you can then stop by our lab to have the test done at a later time.    Video visits are billed at different rates depending on your insurance coverage.  Please reach out to your insurance provider with any questions.    If during the course of the call the physician/provider feels a video visit is not appropriate, you will not be charged for this service.\"    Patient has given verbal consent for Video visit? Yes    How would you like to obtain your AVS? Mail a copy    Patient would like the video invitation sent by: Text to cell phone: [phone]    Will anyone else be joining your video visit? No      Subjective     Penelope Childress is a 44 year old female who presents today via video visit for the following health issues:    HPI  Acute Illness   Acute illness concerns: stomach aches/fatigue  Onset: x couple of days did start new weight loss program in May    Fever: no    Chills/Sweats: no    Headache (location?): no    Sinus Pressure:no    Conjunctivitis:  no    Ear Pain: no    Rhinorrhea: no    Congestion: no    Sore Throat: no    Stomach cramping yes    Bloating gas: yes       Cough: no    Wheeze: no    Decreased Appetite: no    Nausea: YES    Vomiting: YES- Monday and Tuesday    Diarrhea:  YES    Dysuria/Freq.: no    Fatigue/Achiness: YES    Sick/Strep Exposure: no     Therapies Tried and outcome: pepto. mylanta gas drops.      Video Start Time: 12:58    Patient Active " Problem List   Diagnosis     Vitamin D deficiency     IUD (intrauterine device) in place     CARDIOVASCULAR SCREENING; LDL GOAL LESS THAN 100     Class 1 obesity with body mass index (BMI) of 34.0 to 34.9 in adult, unspecified obesity type, unspecified whether serious comorbidity present     Past Surgical History:   Procedure Laterality Date     NO HISTORY OF SURGERY         Social History     Tobacco Use     Smoking status: Never Smoker     Smokeless tobacco: Never Used   Substance Use Topics     Alcohol use: No     Family History   Problem Relation Age of Onset     Diabetes Mother      Diabetes Father          Current Outpatient Medications   Medication Sig Dispense Refill     dicyclomine (BENTYL) 10 MG capsule Take 1 capsule (10 mg) by mouth 3 times daily as needed 21 capsule 0     paragard intrauterine copper 1 each by Intrauterine route once for 1 dose 1 each      prochlorperazine (COMPAZINE) 10 MG tablet Take 1 tablet (10 mg) by mouth every 8 hours as needed for nausea or vomiting 21 tablet 0     amoxicillin-clavulanate (AUGMENTIN) 875-125 MG tablet Take 1 tablet by mouth 2 times daily (Patient not taking: Reported on 2/6/2020) 20 tablet 0     benzonatate (TESSALON) 100 MG capsule Take 1 capsule (100 mg) by mouth 3 times daily as needed for cough (Patient not taking: Reported on 6/3/2020) 30 capsule 1     fexofenadine-pseudoePHEDrine (ALLEGRA-D 24) 180-240 MG 24 hr tablet Take 1 tablet by mouth daily (Patient not taking: Reported on 6/3/2020) 30 tablet 0     fluticasone (FLONASE) 50 MCG/ACT nasal spray SPRAY 1-2 SPRAYS INTO BOTH NOSTRILS DAILY (Patient not taking: Reported on 6/3/2020) 16 mL 5     No Known Allergies  Recent Labs   Lab Test 02/07/20  1122 12/31/18  1004 12/04/17  0931   * 84 100*   HDL 41* 44* 42*   TRIG 103 102 106   ALT 22 23  --    CR 0.69 0.72 0.63   GFRESTIMATED >90 >90 >90   GFRESTBLACK >90 >90 >90   POTASSIUM 4.2 4.1 3.8   TSH 2.95 2.59 2.55      BP Readings from Last 3  "Encounters:   02/06/20 104/80   01/23/20 114/78   12/31/18 102/71    Wt Readings from Last 3 Encounters:   02/06/20 93.9 kg (207 lb)   01/23/20 95.3 kg (210 lb)   12/31/18 90.3 kg (199 lb)                    Reviewed and updated as needed this visit by Provider         Review of Systems   Constitutional, HEENT, cardiovascular, pulmonary, gi and gu systems are negative, except as otherwise noted.      Objective    There were no vitals taken for this visit.  Estimated body mass index is 38.34 kg/m  as calculated from the following:    Height as of 2/6/20: 1.565 m (5' 1.61\").    Weight as of 2/6/20: 93.9 kg (207 lb).  Physical Exam     GENERAL: Healthy, alert and no distress  EYES: Eyes grossly normal to inspection.  No discharge or erythema, or obvious scleral/conjunctival abnormalities.  RESP: No audible wheeze, cough, or visible cyanosis.  No visible retractions or increased work of breathing.    SKIN: Visible skin clear. No significant rash, abnormal pigmentation or lesions.  NEURO: Cranial nerves grossly intact.  Mentation and speech appropriate for age.  PSYCH: Mentation appears normal, affect normal/bright, judgement and insight intact, normal speech and appearance well-groomed.      Diagnostic Test Results:  Labs reviewed in Epic        Assessment & Plan       ICD-10-CM    1. Stomach spasm  K31.89 dicyclomine (BENTYL) 10 MG capsule   2. Nausea  R11.0 prochlorperazine (COMPAZINE) 10 MG tablet   3. Vomiting and diarrhea  R11.10 prochlorperazine (COMPAZINE) 10 MG tablet    R19.7 dicyclomine (BENTYL) 10 MG capsule      has been doing KETO diet and started abd pain 3-4 days ago, and started having diarrhea and profound body ache since yesterday   Will have her to try clear liquid diet for next 2 weeks and start taking prescription as needed   Also encouraged her to keep monitoring sx for next 1-2 more days with home quarantine and decide going to check COVID-19 serology test if her sx not improving   Pt acknowledged " and agreed with the plan     FUTURE APPOINTMENTS:       - Follow-up visit as needed     No follow-ups on file.    Eran Walter MD  [facility] [employer] [facility]      Video-Visit Details    Type of service:  Video Visit    Video End Time:13:20    Originating Location (pt. Location): Home    Distant Location (provider location):  [facility]     Platform used for Video Visit: Doximity    No follow-ups on file.       Eran Walter MD

## 2020-06-04 ENCOUNTER — NURSE TRIAGE (OUTPATIENT)
Dept: NURSING | Facility: CLINIC | Age: 45
End: 2020-06-04

## 2020-06-04 ENCOUNTER — TELEPHONE (OUTPATIENT)
Dept: FAMILY MEDICINE | Facility: CLINIC | Age: 45
End: 2020-06-04

## 2020-06-04 DIAGNOSIS — A09 TRAVELER'S DIARRHEA: Primary | ICD-10-CM

## 2020-06-04 DIAGNOSIS — R19.7 DIARRHEA, UNSPECIFIED TYPE: Primary | ICD-10-CM

## 2020-06-04 NOTE — TELEPHONE ENCOUNTER
Yesterday 10 times and today 15 times.    Patient requests prescription medication.    Took Imodium last night and this morning with no relief.     Drinking liquids. Patient states that she feels a little dizzy and weak. No blood, stools yellowish in color. Does not think she needs IV at this point, but having too many stools.     Patient had Covid testing done.    Taking both medications prescribed yesterday with no improvement. Shanta Finch RN

## 2020-06-04 NOTE — TELEPHONE ENCOUNTER
Patient calling back frustrated that pepto-bismol was ordered for her. States that she has tried both that and imodium with no relief. She is wondering if there is something stronger that can be rx'd?    Ibis Winn RN   [facility] - Triage

## 2020-06-04 NOTE — TELEPHONE ENCOUNTER
Patient given message from Dr. Walter. Patient verbalizes understanding and agrees with plan. States that she is working on hydration and will go to ER if worsening light headedness.   Shanta Finch RN

## 2020-06-04 NOTE — TELEPHONE ENCOUNTER
I send bismth subsalicylate to pharmacy, please have her to take it   And, when she finds her COVID-19 tests negative, she should stop by our  clinic to  the stool container for stool sample test   Please encourage hydration, and if sx worsening with lightheadedness, she should be seen at ER  thx

## 2020-06-04 NOTE — TELEPHONE ENCOUNTER
Reason for call:  Patient reporting a symptom    Symptom or request: Penelope is calling saying she is still having diarrhea today. She had a video visit with Dr. Walter yesterday for this. She would like a call back.    Duration (how long have symptoms been present): Ongoing    Have you been treated for this before? Yes    Phone Number patient can be reached at:  Cell number on file:    Telephone Information:   Mobile [phone]     Best Time:  Anytime    Can we leave a detailed message on this number:  YES    Call taken on 6/4/2020 at 3:07 PM by Moira Maciel

## 2020-06-04 NOTE — TELEPHONE ENCOUNTER
Patient calling. States she has diarrhea and she wants to know if she should go to the emergency department.    Patient is having stools every 15 minutes. States they are very watery, no blood.    States she has not had much cramping today and denies abdominal pain at this time.    Spoke with PCP earlier today and he prescribed Pepto-Bismol which her  got from the pharmacy.    States she has taken one dose - 10 minutes before she called. Has not stooled since taking the Pepto Bismol.    Reviewed increasing fluid intake to prevent dehydration. She will wait until after 2-3 doses of new medication to determine effects.    Protocol and care advice reviewed  Caller states understanding of the recommended disposition    Advised to call back if further questions or concerns      Additional Information    Negative: Shock suspected (e.g., cold/pale/clammy skin, too weak to stand, low BP, rapid pulse)    Negative: Difficult to awaken or acting confused (e.g., disoriented, slurred speech)    Negative: Sounds like a life-threatening emergency to the triager    Negative: [1] SEVERE abdominal pain (e.g., excruciating) AND [2] present > 1 hour    Negative: [1] SEVERE abdominal pain AND [2] age > 60    Negative: [1] Blood in the stool AND [2] moderate or large amount of blood    Negative: Black or tarry bowel movements  (Exception: chronic-unchanged  black-grey bowel movements AND is taking iron pills or Pepto-bismol)    Negative: [1] Drinking very little AND [2] dehydration suspected (e.g., no urine > 12 hours, very dry mouth, very lightheaded)    Negative: Patient sounds very sick or weak to the triager    Negative: [1] SEVERE diarrhea (e.g., 7 or more times / day more than normal) AND [2] age > 60 years    Negative: [1] Constant abdominal pain AND [2] present > 2 hours    Negative: [1] Fever > 103 F (39.4 C) AND [2] not able to get the fever down using Fever Care Advice    [1] SEVERE diarrhea (e.g., 7 or more times / day  more than normal) AND [2] present > 24 hours (1 day)    Protocols used: DIARRHEA-A-AH

## 2020-06-04 NOTE — TELEPHONE ENCOUNTER
There is no stronger antidiarrhea medicine at current status  She should try both med(imodium qid, and bismuth tid) for now until sx improving. I will review the stool test results when available  If sx worsening before COVID results available, pt should be checked at ER    thx

## 2020-06-04 NOTE — TELEPHONE ENCOUNTER
Spoke with patient and informed of below. Patient/ parent verbalized understanding and agrees with plan.    Ibis Winn RN   [facility] - Triage

## 2020-06-05 ENCOUNTER — NURSE TRIAGE (OUTPATIENT)
Dept: FAMILY MEDICINE | Facility: CLINIC | Age: 45
End: 2020-06-05

## 2020-06-05 NOTE — TELEPHONE ENCOUNTER
Pt calling with diarrhea that increased after virtual visit with Dr. Walter on Wednesday.  Pt is having 10-25 stools per day and today has had 10 stools.  She is taking imodium to help with stools.  Denies blood or black tarry stools.  Denies fever or vomiting.  Has been trying to eat but states the food will go through her.  She has been drinking and staying hydrated.  Continues to take bentyl and compazine that was prescribed on Wednesday.  Pt would like rx to control the stooling.    Pharmacy pended.    Pt can be reached at [phone].    Suzanne LUCERO RN  EP Triage      Additional Information    Negative: Shock suspected (e.g., cold/pale/clammy skin, too weak to stand, low BP, rapid pulse)    Negative: Difficult to awaken or acting confused (e.g., disoriented, slurred speech)    Negative: Sounds like a life-threatening emergency to the triager    Negative: Vomiting also present and worse than the diarrhea    Negative: [1] Blood in stool AND [2] without diarrhea    Negative: Diarrhea in a cancer patient who is currently (or recently) receiving chemotherapy or radiation therapy, or cancer patient who has metastatic or end-stage cancer and is receiving palliative care    Negative: [1] SEVERE abdominal pain (e.g., excruciating) AND [2] present > 1 hour    Negative: [1] SEVERE abdominal pain AND [2] age > 60    Negative: [1] Blood in the stool AND [2] moderate or large amount of blood    Negative: Black or tarry bowel movements  (Exception: chronic-unchanged  black-grey bowel movements AND is taking iron pills or Pepto-bismol)    Negative: [1] Drinking very little AND [2] dehydration suspected (e.g., no urine > 12 hours, very dry mouth, very lightheaded)    Negative: Patient sounds very sick or weak to the triager    Negative: [1] SEVERE diarrhea (e.g., 7 or more times / day more than normal) AND [2] age > 60 years    Negative: [1] Constant abdominal pain AND [2] present > 2 hours    Negative: [1] Fever > 103 F (39.4 C)  "AND [2] not able to get the fever down using Fever Care Advice    [1] SEVERE diarrhea (e.g., 7 or more times / day more than normal) AND [2] present > 24 hours (1 day)    Answer Assessment - Initial Assessment Questions  1. DIARRHEA SEVERITY: \"How bad is the diarrhea?\" \"How many extra stools have you had in the past 24 hours than normal?\"     - NO DIARRHEA (SCALE 0)    - MILD (SCALE 1-3): Few loose or mushy BMs; increase of 1-3 stools over normal daily number of stools; mild increase in ostomy output.    -  MODERATE (SCALE 4-7): Increase of 4-6 stools daily over normal; moderate increase in ostomy output.  * SEVERE (SCALE 8-10; OR 'WORST POSSIBLE'): Increase of 7 or more stools daily over normal; moderate increase in ostomy output; incontinence.      10 stools  2. ONSET: \"When did the diarrhea begin?\"       Wednesday after visit with Dr. Walter  3. BM CONSISTENCY: \"How loose or watery is the diarrhea?\"       loose  4. VOMITING: \"Are you also vomiting?\" If so, ask: \"How many times in the past 24 hours?\"       No vomiting  5. ABDOMINAL PAIN: \"Are you having any abdominal pain?\" If yes: \"What does it feel like?\" (e.g., crampy, dull, intermittent, constant)       No abdominal pain but continues to take the bentyl  6. ABDOMINAL PAIN SEVERITY: If present, ask: \"How bad is the pain?\"  (e.g., Scale 1-10; mild, moderate, or severe)    - MILD (1-3): doesn't interfere with normal activities, abdomen soft and not tender to touch     - MODERATE (4-7): interferes with normal activities or awakens from sleep, tender to touch     - SEVERE (8-10): excruciating pain, doubled over, unable to do any normal activities        No pain  7. ORAL INTAKE: If vomiting, \"Have you been able to drink liquids?\" \"How much fluids have you had in the past 24 hours?\"      Drinking liquids  8. HYDRATION: \"Any signs of dehydration?\" (e.g., dry mouth [not just dry lips], too weak to stand, dizziness, new weight loss) \"When did you last urinate?\"      " "no  9. EXPOSURE: \"Have you traveled to a foreign country recently?\" \"Have you been exposed to anyone with diarrhea?\" \"Could you have eaten any food that was spoiled?\"      no  10. ANTIBIOTIC USE: \"Are you taking antibiotics now or have you taken antibiotics in the past 2 months?\"        Benyl, compazine, and imodium  11. OTHER SYMPTOMS: \"Do you have any other symptoms?\" (e.g., fever, blood in stool)        No   12. PREGNANCY: \"Is there any chance you are pregnant?\" \"When was your last menstrual period?\"        No chance of pregnancy.    Protocols used: DIARRHEA-A-      "

## 2020-06-05 NOTE — TELEPHONE ENCOUNTER
Non detailed message left for pt to return call to clinic and ask to speak with a triage nurse.    Suzanne LUCERO RN  EP Triage

## 2020-06-05 NOTE — TELEPHONE ENCOUNTER
Reason for Call:  Other     Detailed comments: Had appt on wed. - still not feeling any better - Pt would like to have an prescription for Antibiotics       Phone Number Patient can be reached at: Cell number on file:    Telephone Information:   Mobile [phone]       Best Time: any    Can we leave a detailed message on this number? YES    Call taken on 6/5/2020 at 12:19 PM by Martha Elliott

## 2020-06-06 RX ORDER — CIPROFLOXACIN 250 MG/1
250 TABLET, FILM COATED ORAL 2 TIMES DAILY
Qty: 5 TABLET | Refills: 0 | Status: SHIPPED | OUTPATIENT
Start: 2020-06-06 | End: 2020-06-12

## 2020-06-06 NOTE — TELEPHONE ENCOUNTER
Please have her to try ciprofloxacin for next  3 days,   However, please have her to update us her covid test results, and we can have her to do stool sample test  thx

## 2020-06-08 NOTE — TELEPHONE ENCOUNTER
Covid is negative per [facility].    Diarrhea is completely gone. She is still having some bloating. I suggested bland diet and can try OTC gas x.    She declined antibiotic and stool samples.     Samantha Black RN- Triage [employer]

## 2020-06-10 ENCOUNTER — TELEPHONE (OUTPATIENT)
Dept: FAMILY MEDICINE | Facility: CLINIC | Age: 45
End: 2020-06-10

## 2020-06-10 NOTE — TELEPHONE ENCOUNTER
If she has no more diarrhea, she is not indicated taking abx.  Encourage her to keep taking peptobismol and gas x for gas for now.  If she start having bout of diarrhea, she should do the stool test before discussing about abx for accurate diagnosis  kalyn

## 2020-06-10 NOTE — TELEPHONE ENCOUNTER
Spoke with patient, she tried milycon, gas x, pepto bismol for gas which is not helping. Diarrhea has resolved. She now wants to try the antibiotic Dr. Walter offered. I asked her about submitting stool samples but she said she would rather try cipro x 3 days as offered by provider and if no improvement then she will try stool samples. Pharmacy pended, please advise.     Ibis Winn RN   [facility] - Triage

## 2020-06-10 NOTE — TELEPHONE ENCOUNTER
Reason for call:  Patient reporting a symptom    Symptom or request: Bloating & nausea still happening.      Duration (how long have symptoms been present): 1 week    Have you been treated for this before? Yes    Additional comments: Please call her back to triage her    Phone Number patient can be reached at:  Cell number on file:    Telephone Information:   Mobile [phone]       Best Time:  Any      Can we leave a detailed message on this number:  YES    Call taken on 6/10/2020 at 2:18 PM by Sherrell Lilly

## 2020-06-10 NOTE — TELEPHONE ENCOUNTER
Spoke with patient and informed of below. She is very frustrated and now states she is having intermittent diarrhea. I advised her that then she needs to do the stool tests. She will come to clinic tomorrow to  supplies.   Ibis Winn RN   [facility] - Triage

## 2020-06-11 DIAGNOSIS — R19.7 DIARRHEA, UNSPECIFIED TYPE: ICD-10-CM

## 2020-06-11 LAB
C COLI+JEJUNI+LARI FUSA STL QL NAA+PROBE: NOT DETECTED
C DIFF TOX B STL QL: NEGATIVE
EC STX1 GENE STL QL NAA+PROBE: NOT DETECTED
EC STX2 GENE STL QL NAA+PROBE: NOT DETECTED
ENTERIC PATHOGEN COMMENT: NORMAL
NOROV GI+II ORF1-ORF2 JNC STL QL NAA+PR: NOT DETECTED
RVA NSP5 STL QL NAA+PROBE: NOT DETECTED
SALMONELLA SP RPOD STL QL NAA+PROBE: NOT DETECTED
SHIGELLA SP+EIEC IPAH STL QL NAA+PROBE: NOT DETECTED
SPECIMEN SOURCE: NORMAL
V CHOL+PARA RFBL+TRKH+TNAA STL QL NAA+PR: NOT DETECTED
Y ENTERO RECN STL QL NAA+PROBE: NOT DETECTED

## 2020-06-11 PROCEDURE — 87493 C DIFF AMPLIFIED PROBE: CPT | Mod: 59 | Performed by: FAMILY MEDICINE

## 2020-06-11 PROCEDURE — 87177 OVA AND PARASITES SMEARS: CPT | Performed by: FAMILY MEDICINE

## 2020-06-11 PROCEDURE — 87506 IADNA-DNA/RNA PROBE TQ 6-11: CPT | Performed by: FAMILY MEDICINE

## 2020-06-11 PROCEDURE — 87209 SMEAR COMPLEX STAIN: CPT | Performed by: FAMILY MEDICINE

## 2020-06-11 PROCEDURE — 36415 COLL VENOUS BLD VENIPUNCTURE: CPT | Performed by: FAMILY MEDICINE

## 2020-06-11 PROCEDURE — 87329 GIARDIA AG IA: CPT | Mod: 59 | Performed by: FAMILY MEDICINE

## 2020-06-11 PROCEDURE — 87328 CRYPTOSPORIDIUM AG IA: CPT | Performed by: FAMILY MEDICINE

## 2020-06-12 ENCOUNTER — TELEPHONE (OUTPATIENT)
Dept: FAMILY MEDICINE | Facility: CLINIC | Age: 45
End: 2020-06-12

## 2020-06-12 DIAGNOSIS — R19.7 DIARRHEA, UNSPECIFIED TYPE: Primary | ICD-10-CM

## 2020-06-12 LAB
C PARVUM AG STL QL IA: NEGATIVE
G LAMBLIA AG STL QL IA: NEGATIVE
O+P STL MICRO: NORMAL
O+P STL MICRO: NORMAL
SPECIMEN SOURCE: NORMAL
SPECIMEN SOURCE: NORMAL

## 2020-06-12 RX ORDER — SUCRALFATE 1 G/1
1 TABLET ORAL 2 TIMES DAILY
Qty: 60 TABLET | Refills: 0 | Status: SHIPPED | OUTPATIENT
Start: 2020-06-12 | End: 2020-06-29

## 2020-06-12 NOTE — TELEPHONE ENCOUNTER
Had direct conversation with her, her sx changed to epigastric pain/bloating and burning  We will have her to take off of peptobismol and bentyl, but keep on taking compazine as needed for  Nausea.   And, will have her to start taking sucralfate with current dose of Pepcid OTC bid   Pt acknowledged and agreed with the plan, she will contact us in 2 weeks if not improving, will have her to see GI in the case, she agreed

## 2020-06-12 NOTE — TELEPHONE ENCOUNTER
Spoke with patient and informed her of lab results below and Dr. Walter's note. Patient was arguing on the phone with RN and would not let RN speak to go through results with her. Patient was told x3 of Dr. Walter's note below and patient continued to state that everyone is wasting her time. RN again went through patients result note below and informed her at this time would she like a referral for GI specialist? Patient states yes then hung up the phone on RN.     Penelope,     Fortunately, your stool sample test results are all negative with Giardia/Bacterial dysenteric bacteria/viru including Campylobacter, Salmonella, Shigella, Cholera, Shiga and Noro/Chlostridium Difficile/Parasite.   I would say there is no serious pathogenic sign of infection in your intestine so far. And, you might not need to take antibiotics   However, if you continue to have diarrhea, I may consider referral to gastroenterologist. Please keep monitoring and update us in the case.       Routing to Dr. Walter to advise on referral for GI.     Darling Zapata RN, BSN  [facility] [place]

## 2020-06-12 NOTE — TELEPHONE ENCOUNTER
Reason for Call:  Request for results:    Name of test or procedure: All labs from 06/11/2020    Location of the test or procedure: [facility]    OK to leave the result message on voice mail or with a family member? YES    Phone number Patient can be reached at:  Cell number on file:    Telephone Information:   Mobile [phone]     Call taken on 6/12/2020 at 3:12 PM by Moira Maciel

## 2020-06-25 ENCOUNTER — TRANSFERRED RECORDS (OUTPATIENT)
Dept: HEALTH INFORMATION MANAGEMENT | Facility: CLINIC | Age: 45
End: 2020-06-25

## 2020-06-29 ENCOUNTER — OFFICE VISIT (OUTPATIENT)
Dept: FAMILY MEDICINE | Facility: CLINIC | Age: 45
End: 2020-06-29
Payer: COMMERCIAL

## 2020-06-29 VITALS
TEMPERATURE: 97.9 F | WEIGHT: 197 LBS | SYSTOLIC BLOOD PRESSURE: 100 MMHG | OXYGEN SATURATION: 100 % | DIASTOLIC BLOOD PRESSURE: 70 MMHG | BODY MASS INDEX: 36.48 KG/M2 | HEART RATE: 77 BPM

## 2020-06-29 DIAGNOSIS — K21.00 GASTROESOPHAGEAL REFLUX DISEASE WITH ESOPHAGITIS: ICD-10-CM

## 2020-06-29 DIAGNOSIS — K30 INDIGESTION: ICD-10-CM

## 2020-06-29 DIAGNOSIS — R07.89 ATYPICAL CHEST PAIN: Primary | ICD-10-CM

## 2020-06-29 PROCEDURE — 93000 ELECTROCARDIOGRAM COMPLETE: CPT | Performed by: FAMILY MEDICINE

## 2020-06-29 PROCEDURE — 99214 OFFICE O/P EST MOD 30 MIN: CPT | Performed by: FAMILY MEDICINE

## 2020-06-29 RX ORDER — PANTOPRAZOLE SODIUM 40 MG/1
40 TABLET, DELAYED RELEASE ORAL DAILY
Qty: 30 TABLET | Refills: 1 | Status: SHIPPED | OUTPATIENT
Start: 2020-06-29 | End: 2020-08-31

## 2020-06-29 NOTE — PROGRESS NOTES
Subjective     Penelope Childress is a 44 year old female who presents to clinic today for the following health issues:    HPI   GERD/Heartburn  Onset: 20 days     Description:     Burning in chest: YES    Intensity: moderate    Progression of Symptoms: intermittent    Accompanying Signs & Symptoms:  Does it feel like food gets stuck: YES  Nausea: YES  Vomiting (bloody?): no  Abdominal Pain: no  Black-Tarry stools: no:  Bloody stools: no    History:   Previous ulcers: no    Precipitating factors:   Caffeine use: YES- not daily though   Alcohol use: no  NSAID/Aspirin use: no  Tobacco use: no  Worse with fatty foods and spicy foods.    Alleviating factors:  None     Therapies Tried and outcome:gaviscon, infants mylicon , prilosec, sucralfate- somewhat helpful             Reviewed and updated as needed this visit by Provider         Review of Systems   Constitutional, HEENT, cardiovascular, pulmonary, gi and gu systems are negative, except as otherwise noted.      Objective    There were no vitals taken for this visit.  There is no height or weight on file to calculate BMI.  Physical Exam   GENERAL: healthy, alert and no distress  NECK: no adenopathy, no asymmetry, masses, or scars and thyroid normal to palpation  RESP: lungs clear to auscultation - no rales, rhonchi or wheezes  CV: regular rate and rhythm, normal S1 S2, no S3 or S4, no murmur, click or rub, no peripheral edema and peripheral pulses strong  ABDOMEN: soft, nontender, no hepatosplenomegaly, no masses and bowel sounds normal  MS: no gross musculoskeletal defects noted, no edema    Diagnostic Test Results:  Labs reviewed in Epic        Assessment & Plan     1. Atypical chest pain  Patient does not have any exertional symptoms.  She does not have any sweating episode or any pain which is associated with shortness of breath.  EKG was done which was normal.  - EKG 12-lead complete w/read - Clinics    2. Indigestion    - pantoprazole (PROTONIX) 40 MG EC tablet;  Take 1 tablet (40 mg) by mouth daily  Dispense: 30 tablet; Refill: 1  Midline chest discomfort most associated with food most likely due to acid reflux.  I will switch her omeprazole to Protonix to see if that helps.  She can continue using simethicone to see if that helps with the gas discomfort.  She is advised to follow-up if symptoms are not getting better.  3. Gastroesophageal reflux disease with esophagitis    - pantoprazole (PROTONIX) 40 MG EC tablet; Take 1 tablet (40 mg) by mouth daily  Dispense: 30 tablet; Refill: 1         Erasmo Barron MD  [facility] [place]

## 2020-07-21 DIAGNOSIS — K30 INDIGESTION: ICD-10-CM

## 2020-07-21 DIAGNOSIS — K21.00 GASTROESOPHAGEAL REFLUX DISEASE WITH ESOPHAGITIS: ICD-10-CM

## 2020-07-21 RX ORDER — PANTOPRAZOLE SODIUM 40 MG/1
TABLET, DELAYED RELEASE ORAL
Qty: 30 TABLET | Refills: 1 | OUTPATIENT
Start: 2020-07-21

## 2020-08-29 ENCOUNTER — TELEPHONE (OUTPATIENT)
Dept: FAMILY MEDICINE | Facility: CLINIC | Age: 45
End: 2020-08-29

## 2020-08-29 DIAGNOSIS — K21.00 GASTROESOPHAGEAL REFLUX DISEASE WITH ESOPHAGITIS: ICD-10-CM

## 2020-08-29 DIAGNOSIS — K30 INDIGESTION: ICD-10-CM

## 2020-08-31 RX ORDER — PANTOPRAZOLE SODIUM 40 MG/1
TABLET, DELAYED RELEASE ORAL
Qty: 30 TABLET | Refills: 8 | Status: SHIPPED | OUTPATIENT
Start: 2020-08-31 | End: 2020-10-06

## 2020-10-06 ENCOUNTER — TELEPHONE (OUTPATIENT)
Dept: FAMILY MEDICINE | Facility: CLINIC | Age: 45
End: 2020-10-06

## 2020-10-06 ENCOUNTER — OFFICE VISIT (OUTPATIENT)
Dept: FAMILY MEDICINE | Facility: CLINIC | Age: 45
End: 2020-10-06
Payer: COMMERCIAL

## 2020-10-06 VITALS
TEMPERATURE: 98.4 F | WEIGHT: 196 LBS | SYSTOLIC BLOOD PRESSURE: 110 MMHG | HEART RATE: 90 BPM | BODY MASS INDEX: 36.07 KG/M2 | OXYGEN SATURATION: 98 % | HEIGHT: 62 IN | DIASTOLIC BLOOD PRESSURE: 68 MMHG

## 2020-10-06 DIAGNOSIS — Z71.85 VACCINE COUNSELING: Primary | ICD-10-CM

## 2020-10-06 PROCEDURE — 99000 SPECIMEN HANDLING OFFICE-LAB: CPT | Performed by: FAMILY MEDICINE

## 2020-10-06 PROCEDURE — 86787 VARICELLA-ZOSTER ANTIBODY: CPT | Performed by: FAMILY MEDICINE

## 2020-10-06 PROCEDURE — 86735 MUMPS ANTIBODY: CPT | Mod: 90 | Performed by: FAMILY MEDICINE

## 2020-10-06 PROCEDURE — 86765 RUBEOLA ANTIBODY: CPT | Performed by: FAMILY MEDICINE

## 2020-10-06 PROCEDURE — 99213 OFFICE O/P EST LOW 20 MIN: CPT | Performed by: FAMILY MEDICINE

## 2020-10-06 PROCEDURE — 36415 COLL VENOUS BLD VENIPUNCTURE: CPT | Performed by: FAMILY MEDICINE

## 2020-10-06 PROCEDURE — 86762 RUBELLA ANTIBODY: CPT | Performed by: FAMILY MEDICINE

## 2020-10-06 ASSESSMENT — MIFFLIN-ST. JEOR: SCORE: 1481.11

## 2020-10-06 NOTE — PROGRESS NOTES
"Subjective     Penelope Childress is a 45 year old female who presents to clinic today for the following health issues:    HPI         Concern - MMR and Varicella titers     Description: for employment     Patient is planning to get a new employment which requires MMR titers or varicella titer versus vaccination.  She denies any symptoms she remember getting those vaccine but not sure if she has any record.  Would like to get titers to prove she has  underlying immunity.    Review of Systems   Constitutional, HEENT, cardiovascular, pulmonary, gi and gu systems are negative, except as otherwise noted.      Objective    There were no vitals taken for this visit.  There is no height or weight on file to calculate BMI.  Physical Exam   GENERAL: healthy, alert and no distress  NECK: no adenopathy, no asymmetry, masses, or scars and thyroid normal to palpation  RESP: lungs clear to auscultation - no rales, rhonchi or wheezes  CV: regular rate and rhythm, normal S1 S2, no S3 or S4, no murmur, click or rub, no peripheral edema and peripheral pulses strong  MS: no gross musculoskeletal defects noted, no edema            Assessment & Plan     Vaccine counseling  Will order titers.  Once resulted will make recommendation based on the titers either she will need vaccine or she has underline immunity.  - Varicella Zoster Virus Antibody IgG  - Mumps Immune Status, IgG  - Rubella Antibody IgG Quantitative  - Rubeola Antibody IgG     BMI:   Estimated body mass index is 36.3 kg/m  as calculated from the following:    Height as of this encounter: 1.565 m (5' 1.61\").    Weight as of this encounter: 88.9 kg (196 lb).                Erasmo Barron MD  [facility]    "

## 2020-10-06 NOTE — LETTER
October 8, 2020      Penelope Childress  [address] [place] [address]        Dear ,    We are writing to inform you of your test results.    You show  immunity to MMR and varicella. No vaccination needed, below are the results.     Resulted Orders   Varicella Zoster Virus Antibody IgG   Result Value Ref Range    Varicella Zoster Virus Antibody IgG 2.9 (H) 0.0 - 0.8 AI      Comment:      Positive, suggests prev. exposure and probable immunity  Antibody index (AI) values reflect qualitative changes in antibody   concentration that cannot be directly associated with clinical condition or   disease state.     Mumps Immune Status, IgG   Result Value Ref Range    Mumps Antibody IgG 1.3 (H) 0.0 - 0.8 AI      Comment:      Positive, suggests prev. exposure and probable immunity  Antibody index (AI) values reflect qualitative changes in antibody   concentration that cannot be directly associated with clinical condition or   disease state.     Rubella Antibody IgG Quantitative   Result Value Ref Range    Rubella Antibody IgG Quantitative 18 IU/mL      Comment:      Positive.  Suggests previous exposure or immunization and probable immunity  Reference Range:    Unvaccinated Negative 0-7 IU/mL  Vaccinated or previous exposure Positive 10 IU/ml or greater     Rubeola Antibody IgG   Result Value Ref Range    Rubeola (Measles) Antibody IgG 4.4 (H) 0.0 - 0.8 AI      Comment:      Positive, suggests prev. exposure and probable immunity  Antibody index (AI) values reflect qualitative changes in antibody   concentration that cannot be directly associated with clinical condition or   disease state.         If you have any questions or concerns, please call the clinic at the number listed above.       Sincerely,        Erasmo Barron MD

## 2020-10-06 NOTE — TELEPHONE ENCOUNTER
Left vm for patient to schedule a vrt or office visit with Dr. Barron to discuss if patient needs titers drawn for MMR and Varicella.    .Yoli MEDINA    [facility] Le[place] Phelps

## 2020-10-06 NOTE — TELEPHONE ENCOUNTER
Patient was not seen for this particular issue if she is willing we can do phone or office visit to discuss and order what is appropriate.

## 2020-10-06 NOTE — TELEPHONE ENCOUNTER
Pt is here for a lab aappt and there are no orders. She would like MMR and Varicella tires drawn for work. Please place orders if appropriate. Thank you.  Edna Hermosillo,

## 2020-10-07 LAB
MEV IGG SER QL IA: 4.4 AI (ref 0–0.8)
MUV IGG SER QL IA: 1.3 AI (ref 0–0.8)
RUBV IGG SERPL IA-ACNC: 18 IU/ML
VZV IGG SER QL IA: 2.9 AI (ref 0–0.8)

## 2020-10-09 NOTE — TELEPHONE ENCOUNTER
Reason for Call:  Request for results:    Name of test or procedure: Pt had some blood test on 10/06/2020 and would like to know the test results.    Date of test of procedure: 10/06/20    Location of the test or procedure: at the clinic    OK to leave the result message on voice mail or with a family member? YES    Phone number Patient can be reached at:  Home number on file [phone] (home)    Additional comments: no    Call taken on 10/9/2020 at 2:32 PM by Amelia Armas

## 2021-07-28 ENCOUNTER — OFFICE VISIT (OUTPATIENT)
Dept: FAMILY MEDICINE | Facility: CLINIC | Age: 46
End: 2021-07-28
Payer: COMMERCIAL

## 2021-07-28 VITALS
DIASTOLIC BLOOD PRESSURE: 64 MMHG | OXYGEN SATURATION: 98 % | SYSTOLIC BLOOD PRESSURE: 112 MMHG | HEART RATE: 57 BPM | BODY MASS INDEX: 35.7 KG/M2 | WEIGHT: 194 LBS | HEIGHT: 62 IN | TEMPERATURE: 97.3 F

## 2021-07-28 DIAGNOSIS — Z11.59 NEED FOR HEPATITIS C SCREENING TEST: ICD-10-CM

## 2021-07-28 DIAGNOSIS — Z97.5 IUD (INTRAUTERINE DEVICE) IN PLACE: ICD-10-CM

## 2021-07-28 DIAGNOSIS — Z00.00 ROUTINE GENERAL MEDICAL EXAMINATION AT A HEALTH CARE FACILITY: Primary | ICD-10-CM

## 2021-07-28 DIAGNOSIS — Z12.4 SCREENING FOR MALIGNANT NEOPLASM OF CERVIX: ICD-10-CM

## 2021-07-28 DIAGNOSIS — L65.9 HAIR LOSS: ICD-10-CM

## 2021-07-28 DIAGNOSIS — Z12.31 BREAST CANCER SCREENING BY MAMMOGRAM: ICD-10-CM

## 2021-07-28 DIAGNOSIS — E55.9 VITAMIN D DEFICIENCY: ICD-10-CM

## 2021-07-28 LAB
CHOLEST SERPL-MCNC: 151 MG/DL
DEPRECATED CALCIDIOL+CALCIFEROL SERPL-MC: 16 UG/L (ref 20–75)
FASTING STATUS PATIENT QL REPORTED: YES
FASTING STATUS PATIENT QL REPORTED: YES
GLUCOSE BLD-MCNC: 94 MG/DL (ref 70–99)
HCV AB SERPL QL IA: NONREACTIVE
HDLC SERPL-MCNC: 42 MG/DL
LDLC SERPL CALC-MCNC: 89 MG/DL
NONHDLC SERPL-MCNC: 109 MG/DL
TRIGL SERPL-MCNC: 99 MG/DL
TSH SERPL DL<=0.005 MIU/L-ACNC: 3.11 MU/L (ref 0.4–4)

## 2021-07-28 PROCEDURE — 82947 ASSAY GLUCOSE BLOOD QUANT: CPT | Performed by: FAMILY MEDICINE

## 2021-07-28 PROCEDURE — 82306 VITAMIN D 25 HYDROXY: CPT | Performed by: FAMILY MEDICINE

## 2021-07-28 PROCEDURE — 80061 LIPID PANEL: CPT | Performed by: FAMILY MEDICINE

## 2021-07-28 PROCEDURE — 86803 HEPATITIS C AB TEST: CPT | Performed by: FAMILY MEDICINE

## 2021-07-28 PROCEDURE — 99213 OFFICE O/P EST LOW 20 MIN: CPT | Mod: 25 | Performed by: FAMILY MEDICINE

## 2021-07-28 PROCEDURE — 36415 COLL VENOUS BLD VENIPUNCTURE: CPT | Performed by: FAMILY MEDICINE

## 2021-07-28 PROCEDURE — 87624 HPV HI-RISK TYP POOLED RSLT: CPT | Performed by: FAMILY MEDICINE

## 2021-07-28 PROCEDURE — G0145 SCR C/V CYTO,THINLAYER,RESCR: HCPCS | Performed by: FAMILY MEDICINE

## 2021-07-28 PROCEDURE — 99396 PREV VISIT EST AGE 40-64: CPT | Performed by: FAMILY MEDICINE

## 2021-07-28 PROCEDURE — 84443 ASSAY THYROID STIM HORMONE: CPT | Performed by: FAMILY MEDICINE

## 2021-07-28 ASSESSMENT — MIFFLIN-ST. JEOR: SCORE: 1467.04

## 2021-07-28 NOTE — PROGRESS NOTES
SUBJECTIVE:   CC: Penelope Childress is an 46 year old woman who presents for preventive health visit.     Patient has been advised of split billing requirements and indicates understanding: Yes  Healthy Habits:     Getting at least 3 servings of Calcium per day:  Yes    Bi-annual eye exam:  Yes    Dental care twice a year:  Yes    Sleep apnea or symptoms of sleep apnea:  None    Diet:  Regular (no restrictions)    Frequency of exercise:  2-3 days/week    Duration of exercise:  15-30 minutes    Taking medications regularly:  Yes    Barriers to taking medications:  None    Medication side effects:  None    PHQ-2 Total Score: 0    Additional concerns today:  No        PROBLEMS TO ADD ON...    Wants her thyroid checked as she has significant hair loss and thinning of hair now.    also hard to loose weight despite trying hard to exrcise and watching diet.   Sister also got throid probem 2 years ago. No ftaigue, no constipaton or skin changes  otherwise etc           Today's PHQ-2 Score:   PHQ-2 ( 1999 Pfizer) 7/28/2021   Q1: Little interest or pleasure in doing things 0   Q2: Feeling down, depressed or hopeless 0   PHQ-2 Score 0   Q1: Little interest or pleasure in doing things Not at all   Q2: Feeling down, depressed or hopeless Not at all   PHQ-2 Score 0       Abuse: Current or Past (Physical, Sexual or Emotional) - No  Do you feel safe in your environment? Yes    Have you ever done Advance Care Planning? (For example, a Health Directive, POLST, or a discussion with a medical provider or your loved ones about your wishes): No, advance care planning information given to patient to review.  Advanced care planning was discussed at today's visit.    Social History     Tobacco Use     Smoking status: Never Smoker     Smokeless tobacco: Never Used   Substance Use Topics     Alcohol use: No     If you drink alcohol do you typically have >3 drinks per day or >7 drinks per week? No    Alcohol Use 7/28/2021   Prescreen: >3  drinks/day or >7 drinks/week? No   Prescreen: >3 drinks/day or >7 drinks/week? -   No flowsheet data found.    Reviewed orders with patient.  Reviewed health maintenance and updated orders accordingly - Yes  Patient Active Problem List   Diagnosis     Vitamin D deficiency     IUD (intrauterine device) in place     CARDIOVASCULAR SCREENING; LDL GOAL LESS THAN 100     Class 1 obesity with body mass index (BMI) of 34.0 to 34.9 in adult, unspecified obesity type, unspecified whether serious comorbidity present     Past Surgical History:   Procedure Laterality Date     NO HISTORY OF SURGERY         Social History     Tobacco Use     Smoking status: Never Smoker     Smokeless tobacco: Never Used   Substance Use Topics     Alcohol use: No     Family History   Problem Relation Age of Onset     Diabetes Mother      Diabetes Father            Breast Cancer Screening:    Breast CA Risk Assessment (FHS-7) 7/28/2021   Do you have a family history of breast, colon, or ovarian cancer? No / Unknown         Mammogram Screening: Recommended annual mammography  Pertinent mammograms are reviewed under the imaging tab.    History of abnormal Pap smear: NO - age 30- 65 PAP every 3 years recommended  PAP / HPV Latest Ref Rng & Units 12/1/2017   PAP (Historical) - NIL   HPV16 NEG:Negative Negative   HPV18 NEG:Negative Negative   HRHPV NEG:Negative Negative     Reviewed and updated as needed this visit by clinical staff  Tobacco  Allergies  Meds              Reviewed and updated as needed this visit by Provider                Past Medical History:   Diagnosis Date     IUD (intrauterine device) in place 06/2013    paragard        Review of Systems  CONSTITUTIONAL: NEGATIVE for fever, chills, change in weight  INTEGUMENTARU/SKIN: NEGATIVE for worrisome rashes, moles or lesions  EYES: NEGATIVE for vision changes or irritation  ENT: NEGATIVE for ear, mouth and throat problems  RESP: NEGATIVE for significant cough or SOB  BREAST: NEGATIVE for  "masses, tenderness or discharge  CV: NEGATIVE for chest pain, palpitations or peripheral edema  GI: NEGATIVE for nausea, abdominal pain, heartburn, or change in bowel habits  : NEGATIVE for unusual urinary or vaginal symptoms. Periods are regular.   female: no unusual urinary symptoms, no unusual vaginal symptoms and has iud meant for 10 yrs so has few more years still   MUSCULOSKELETAL: NEGATIVE for significant arthralgias or myalgia  NEURO: NEGATIVE for weakness, dizziness or paresthesias  ENDOCRINE: NEGATIVE for temperature intolerance, skin/hair changes, POSITIVE  for  hair loss and weight gain  PSYCHIATRIC: NEGATIVE for changes in mood or affect     OBJECTIVE:   /64   Pulse 57   Temp 97.3  F (36.3  C) (Tympanic)   Ht 1.565 m (5' 1.61\")   Wt 88 kg (194 lb)   LMP 07/18/2021   SpO2 98%   BMI 35.93 kg/m    Physical Exam  GENERAL: healthy, alert and no distress  EYES: Eyes grossly normal to inspection, PERRL and conjunctivae and sclerae normal  HENT: ear canals and TM's normal, nose and mouth without ulcers or lesions  NECK: no adenopathy, no asymmetry, masses, or scars and thyroid normal to palpation  RESP: lungs clear to auscultation - no rales, rhonchi or wheezes  BREAST: normal without masses, tenderness or nipple discharge and no palpable axillary masses or adenopathy  CV: regular rate and rhythm, normal S1 S2, no S3 or S4, no murmur, click or rub, no peripheral edema and peripheral pulses strong  ABDOMEN: soft, nontender, no hepatosplenomegaly, no masses and bowel sounds normal   (female): normal female external genitalia, normal urethral meatus , vaginal mucosa pink, moist, well rugated and normal cervix, adnexae, and uterus without masses. iud thread in place   RECTAL: deferred  MS: no gross musculoskeletal defects noted, no edema  SKIN: no suspicious lesions or rashes  NEURO: Normal strength and tone, mentation intact and speech normal  PSYCH: mentation appears normal, affect " normal/bright        ASSESSMENT/PLAN:   (Z00.00) Routine general medical examination at a health care facility  (primary encounter diagnosis)  Comment:   Plan: Hepatitis C Screen Reflex to HCV RNA Quant and         Genotype, Lipid panel reflex to direct LDL         Fasting, Glucose              (Z11.59) Need for hepatitis C screening test  Comment:   Plan: Hepatitis C Screen Reflex to HCV RNA Quant and         Genotype            (Z12.4) Screening for malignant neoplasm of cervix  Comment:   Plan: Pap imaged thin layer screen with HPV -         recommended age 30 - 65 years            (Z12.31) Breast cancer screening by mammogram  Comment:   Plan: MA SCREENING DIGITAL BILAT - Future  (s+30)            (Z97.5) IUD (intrauterine device) in place  Comment:   Plan:     (L65.9) Hair loss  Comment:   Plan: TSH with free T4 reflex            (Z68.35) BMI 35.0-35.9,adult  Comment: concerned with weight gain   Plan: TSH with free T4 reflex        Healthy diet and exercise reviewed. Risks of obesity discussed.  Encourage exercise.       (E55.9) Vitamin D deficiency  Comment: also wants vit d checked   Plan: Vitamin D Deficiency          Check labs. refill sent.Cares and  treatment discussed follow up if problem   Patient expressed understanding and agreement with treatment plan. All patient's questions were answered, will let me know if has more later.  Medications: Rx's: Reviewed the potential side effects/complications of medications prescribed.     COUNSELING:  Reviewed preventive health counseling, as reflected in patient instructions       Regular exercise       Healthy diet/nutrition       Vision screening       Hearing screening       Immunizations    Vaccinated for:covid already              Osteoporosis prevention/bone health       Colon cancer screening       Consider Hep C screening for all patients one time for ages 18-79 years    Estimated body mass index is 35.93 kg/m  as calculated from the following:    Height  "as of this encounter: 1.565 m (5' 1.61\").    Weight as of this encounter: 88 kg (194 lb).    Weight management plan: Discussed healthy diet and exercise guidelines    She reports that she has never smoked. She has never used smokeless tobacco.      Counseling Resources:  ATP IV Guidelines  Pooled Cohorts Equation Calculator  Breast Cancer Risk Calculator  BRCA-Related Cancer Risk Assessment: FHS-7 Tool  FRAX Risk Assessment  ICSI Preventive Guidelines  Dietary Guidelines for Americans, 2010  USDA's MyPlate  ASA Prophylaxis  Lung CA Screening    Rosa Maria Catalan MD  [facility]  "

## 2021-07-28 NOTE — PATIENT INSTRUCTIONS
Patient Education     Prevention Guidelines, Women Ages 40 to 49  Screening tests and vaccines are an important part of managing your health. A screening test is done to find diseases in people who don't have any symptoms. The goal is to find a disease early so lifestyle changes and checkups can reduce the risk of disease. Or the goal may be to detect it early to treat it most effectively. Screening tests are not used to diagnose a disease. But they are used to see if more testing is needed. Health counseling is important, too. Below are guidelines for these, for women ages 40 to 49. Talk with your healthcare provider to make sure you re up-to-date on what you need.  Screening Who needs it How often   Type 2 diabetes or prediabetes All women beginning at age 45 and women without symptoms at any age who are overweight or obese and have 1 or more additional risk factors for diabetes At least every 3 years1   Type 2 diabetes or prediabetes All women diagnosed with gestational diabetes Lifelong testing every 3 years   Type 2 diabetes All women with prediabetes Every year   Alcohol misuse All women in this age group At routine exams   Blood pressure All women in this age group Yearly checkup if your blood pressure is normal  Normal blood pressure is less than 120/80 mm Hg  If your blood pressure reading is higher than normal, follow the advice of your healthcare provider   Breast cancer All women at average risk in this age group Screening with a mammogram can start at age 40.2 Talk with your healthcare provider to help you decide when to start screening. At age 45 start yearly mammograms.3   Cervical cancer All women in this age group, except women who have had a complete hysterectomy Pap test every 3 years or Pap test plus human papilloma virus (HPV) test every 5 years   Colorectal cancer Women age 45 years and older at average risk Multiple tests are available and are used at different times. Possible tests  include:    Flexible sigmoidoscopy every 5 years, or    Colonoscopy every 10 years, or    CT colonography (virtual colonoscopy) every 5 years, or    Yearly fecal occult blood test, or    Yearly fecal immunochemical test every year, or    Stool DNA test, every 3 years  If you choose a test other than a colonoscopy and have an abnormal test result, you will need to follow-up with a colonoscopy. Screening advice varies among expert groups. Talk with your healthcare provider about which tests are best for you.  Some people should be screened using a different schedule because of their personal or family health history. Talk with your healthcare provider about your health history.   Chlamydia Women at increased risk for infection At routine exams if you're at risk or have symptoms   Depression All women in this age group At routine exams   Gonorrhea Sexually active women at increased risk for infection At routine exams   Hepatitis C Anyone at increased risk; 1 time for those born between 1945 and 1965 At routine exams   High cholesterol or triglycerides All women ages 45 and older who are at risk for coronary artery disease; younger women, talk with your healthcare provider At least every 5 years   HIV All women At routine exams. Those with risk factors for HIV should be tested at least annually.   Obesity All women in this age group At routine exams   Syphilis Women at increased risk for infection-talk with your healthcare provider At routine exams   Tuberculosis Women at increased risk for infection-talk with your healthcare provider Ask your healthcare provider   Vision All women in this age group Complete exam at age 40 and eye exams every 2 to 4 years. If you have a chronic disease, ask your healthcare provider how often you should have your eyes examined.4   Vaccine Who needs it How often   Chickenpox (varicella) All women in this age group who have no record of this infection or vaccine 2 doses; the second dose  should be given at least 4 weeks after the first dose   Hepatitis A Women at increased risk for infection-talk with your healthcare provider 2 doses given 6 months apart   Hepatitis B Women at increased risk for infection-talk with your healthcare provider 3 doses over 6 months; second dose should be given 1 month after the first dose; the third dose should be given at least 2 months after the second dose and at least 4 months after the first dose   Haemophilus influenzae Type B (HIB) Women at increased risk 1 to 3 doses   Influenza (flu) All women in this age group Once a year   Measles, mumps, rubella (MMR) All women in this age group who have no record of these infections or vaccines 1 or 2 doses   Meningococcal Women at increased risk for infection-talk with your healthcare provider 1 or more doses   Pneumococcal conjugate vaccine (PCV13) and pneumococcal polysaccharide vaccine (PPSV23) Women at increased risk for infection-talk with your healthcare provider 1 or 2 doses   Tetanus/diphtheria/pertussis (Td/Tdap) booster All women in this age group A 1-time dose of Tdap instead of a Td booster after age 18, then Td every 10 years   Counseling Who needs it How often   BRCA gene mutation testing for breast and ovarian cancer susceptibility Women with increased risk for having gene mutation When your risk is known   Breast cancer and chemoprevention Women at high risk for breast cancer When your risk is known   Diet and exercise Women who are overweight or obese When diagnosed, and then at routine exams   Domestic violence Women at the age in which they are able to have children At routine exams   Sexually transmitted infection prevention Women at increased risk for infection-talk with your healthcare provider At routine exams   Use of tobacco and the health effects it can cause All women in this age group Every exam   1 American Diabetes Association  2 American College of Obstetricians and Gynecologists   3 American  Cancer Society  4 American Academy of Ophthalmology  [employer] last reviewed this educational content on 11/1/2017 2000-2021 [employer]. All rights reserved. This information is not intended as a substitute for professional medical care. Always follow your healthcare professional's instructions.           Patient Education     Weight Management: Fact and Fiction     Knowing the truth about losing weight can help you separate what works from what doesn t. Don t be taken in by expensive weight-loss fads such as pills, herbs, or special foods that promise unbelievable results. There s no magic way to lose weight. If you have questions about weight loss, ask your healthcare provider.   Fiction:  The faster I lose weight, the better.    Fact: Rapid weight loss is usually because of loss of water or muscle mass. What you re trying to get rid of is extra fat. Aim to lose a 1/2 pound to 2 pounds a week. Then you re more likely to lose fat rather than water or muscle.   Fiction:  I can t start exercising until I lose weight.    Fact: The sooner you start exercising the better. Exercise helps burn more calories, tone your muscles, and keep your appetite in check. People who continue to exercise after they lose weight are more likely to keep the weight off.   Fiction:  The fewer calories I eat, the better.    Fact: This seems like it should be true, but it s not. When you eat too few calories, your body acts as if it s on a desert island. It thinks food is scarce, so it slows down how fast you burn calories (your metabolism) to save energy. By eating too few calories, you make it harder to lose weight.   Fiction:  Once I lose weight, I can go back to living the way I did before.    Fact: Going back to your old eating habits and giving up exercise is a sure way to regain any weight you ve lost. The lifestyle changes that help you lose extra weight can also help keep it off. This is why you need to make  realistic changes you can stick with.   Fiction:  Low-fat and fat-free mean low-calorie.    Fact: All foods, even fat-free ones, have calories. Eat too many calories and you ll gain weight. It s OK to treat yourself to a fat-free cookie or two. Just don t eat the whole box! A dietitian will help you figure this out. Learn to read nutrition labels to see what you are really eating.   [employer] last reviewed this educational content on 11/1/2020 2000-2021 [employer]. All rights reserved. This information is not intended as a substitute for professional medical care. Always follow your healthcare professional's instructions.           Patient Education     Sources of Protein  Below is a list of high-protein foods. Your goal is ______ grams of protein a day.  Type of food Amount Grams protein Calorie level   Animal protein foods      Chicken (white, packed in water) 1 ounce 9 g Low   Turkey (light meat, no skin) 1 ounce 9 g Low   Ham (lean, 5%) 1 ounce 8 g Low   Venison, roasted 1 ounce 8 g Low   Tuna (white, packed in water) 1 ounce 8 g Low   Chicken (light meat, no skin) 1 ounce 8 g Low   Fish (flounder, km, halibut, cod) 1 ounce 7 to 8 g Low   Crab, steamed 1 ounce 7 g Low   Pork tenderloin, lean roasted 1 ounce 6 g Low   Lobster, steamed 1 ounce 6 g Low   Luncheon meat (turkey, ham, chicken) 1 ounce 5 g Low   Imitation seafood (such as crabmeat) 1 ounce 4 g Low   Cooked shrimp or scallops 1 ounce 6 g Low   Liquid egg substitute 1/2 cup 10 to 12 g Medium   Steak (sirloin, trimmed) 1 ounce 10 g Medium   Chicken leg (no skin) 1 ounce 8 g Medium   Pork loin chop 1 ounce 8 g Medium   Veal loin, lean roasted 1 ounce 8 g Medium   Fatty fish (salmon, bluefish, etc.) 1 ounce 7 to 8 g Medium   Hamburger, medium 1 ounce 7 g Medium   Scrambled egg 1 large 6 g Medium   Egg white 1 large 6 g Low   Type of food Amount Grams protein Calorie level   Dairy products      Cottage cheese 1/4 cup 7 g Low   American  cheese (skim, 1%) 1 ounce 6 g Low   Milk (skim, 1%) 1/2 cup 4 g Low   Dry skim milk powder 2 tbsp 4 g Low   Parmesan cheese, grated 1 tbsp 2 g Low   Mozzarella cheese (part skim) 1 ounce 8 g Medium   Ricotta cheese (part skim) 1/4 cup 7 g Medium   [place] Instant Breakfast (made with milk, skim or 1%) 1/2 cup 6.5 g Medium   Pudding 1/2 cup 4 g Medium   Yogurt 1/2 cup 5 g Medium   Greek yogurt 1 cup 12 g Medium   Vegetarian      Soy protein, texturized 1/4 cup 6 g Low   Tofu 1/4 cup 5 g Low   Soymilk, plain 1/2 cup 3 g Low   Tempeh 1/4 cup 8 g Medium   Lentils 1/4 cup 5 g Medium   Kidney beans, black beans, fat-free refried beans (vegetarian) 1/4 cup 4 g Medium   Chickpeas, canned 1/4 cup 4 g Medium   Baked beans, canned 1/4 cup 3 g Medium   Veggie burger (garden burger) 1 soy 7 g High   Soynuts 1/4 cup 17 g Very high   Sunflower seeds, no shells, dry roasted 2 tbsp 8 g Very high   Peanuts, dry roasted 1 ounce 7 g Very high   Almonds 15 6 g Very high   Pistachios 25 6 g Very high   Peanut/almond butter 2 tbsp 8 g Very high       For informational purposes only. Not to replace the advice of [facility] health care provider.  Copyright   2007 [facility]. All rights reserved. [employer] [id number] - 01/16.           Patient Education     Losing Weight for Heart Health  Excess weight is a major risk factor for heart disease. Losing weight has many benefits including lowering your blood pressure, improving your cholesterol level, and decreasing your risk for diseases such as diabetes and heart disease. It may help keep your arteries open so that your heart can get the oxygen-rich blood it needs. All in all, losing weight makes you healthier and is one of the best ways to improve your heart's health.    Calories and weight loss    Calories are the fuel your body burns for energy. You get the calories you need from the food you eat. For healthy weight loss, women should eat at least 1,200 calories a day, men at  least 1,500.    When you eat more calories than you need, your body stores the extra calories as fat. One pound (0.45 kg) of fat equals 3,500 calories.    To lose weight, try to reduce your total calorie intake by 500 calories. To do this, eat 250 calories less each day. Add activity to burn the other 250 calories. Walking 2.5 miles (4 km) burns about 250 calories. Other more intense activities can burn more calories in the time you spend doing them, such as swimming and running. It's important to understand that reducing calorie intake is much more effective at weight loss than is exercise.    Eat a variety of healthy foods to get the nutrients you need while you are cutting your calories to reduce your weight.    Tips for losing weight    Drink 8 to 10 glasses of water a day.    Don t skip meals. Instead, eat smaller portions.    Eat your meals earlier in the day.    Cut out sugary drinks such as soda and fruit juices.    Make your later meals lighter than your earlier meals.    Brisk activity is best  Brisk activity gets your heart pumping faster and it makes it healthier. It s also a great way to burn calories. In fact, your body may keep burning calories for hours after you stop a brisk activity:    Start by walking 10 minutes most days.    Add more time and speed to your walk. Build up as you feel able.    Aim for at least 150 minutes of moderate-intensity aerobic activity such as brisk walking or 75 minutes of vigorous aerobic activity such as swimming laps each week to get the most health benefits.    Get up a move and sit less. Sitting too much is linked with increased risk of heart disease. Moving more and sitting less can help cut this risk.    The most important part of the activity is that you break a sweat. This means your heart is working hard enough to burn fat.  [employer] last reviewed this educational content on 7/1/2019 2000-2021 [employer]. All rights reserved. This information  is not intended as a substitute for professional medical care. Always follow your healthcare professional's instructions.           Patient Education     Weight Management: Overcoming Your Barriers    You may have many reasons why you re not ready to lose weight. You may not feel you have the time or the skills. You may be afraid of losing weight and gaining it back again. Well, you can lose weight and can keep the weight off, if you make changes slowly and stick with them. Remember that you may never find the perfect time to lose weight. Decide that the right time to be healthier is now.  Common barriers  Barrier 1: I don t want to deny myself.   Barrier Buster: You don t have to! Moderation is the key:     Watch portion sizes and know when you're eating more than one serving.    Plan to ask for a doggy bag when you eat out.    Have just one.    Read the labels on foods to know what foods may be hiding calories or salt.    Choose lower-fat and lower-calorie versions of your favorites.    Use a small plate instead of a normal-sized plate.   Barrier 2: I lost weight before, but I gained it right back.   Barrier Buster: Make this time different:     List what worked and didn t work last time and what you can try this time.    Choose changes that you are willing to stick with.    Work exercise into your weight-loss plan.    Be realistic about what is possible. Your plan has to fit into your life in a balanced way that works for you.   Barrier 3: I don t have the time to be active.   Barrier Buster: It takes just a few minutes a day!     Be active with a pet or the kids.    Block off activity time in your schedule.    Borrow some time that you usually spend watching TV.    You are too important not to take time to exercise--it is your life!   Feel good about yourself  Do you eat more because you feel bad about yourself, then feel even worse as you gain weight? This is a  vicious cycle.  Breaking this cycle is not easy.  You may need group support or counseling. Always remember that you are a valuable person, no matter what size or shape you are.  Get motivated  Do you have a health problem? If so, don t use it as an excuse for not losing weight. Ask your healthcare provider or dietitian about methods to lose weight that are safe for you. For example, even if you have severe arthritis, it may be easier for you to exercise in a pool. Get advice from a .   .com last reviewed this educational content on 11/1/2020 2000-2021 [employer]. All rights reserved. This information is not intended as a substitute for professional medical care. Always follow your healthcare professional's instructions.

## 2021-07-30 LAB
BKR LAB AP GYN ADEQUACY: NORMAL
BKR LAB AP GYN INTERPRETATION: NORMAL
BKR LAB AP GYN OTHER FINDINGS: NORMAL
BKR LAB AP HPV REFLEX: NORMAL
BKR LAB AP LMP: NORMAL
BKR LAB AP PREVIOUS ABNORMAL: NORMAL
PATH REPORT.COMMENTS IMP SPEC: NORMAL
PATH REPORT.RELEVANT HX SPEC: NORMAL

## 2021-08-02 ENCOUNTER — TELEPHONE (OUTPATIENT)
Dept: FAMILY MEDICINE | Facility: CLINIC | Age: 46
End: 2021-08-02

## 2021-08-02 DIAGNOSIS — E55.9 VITAMIN D DEFICIENCY: Primary | ICD-10-CM

## 2021-08-02 LAB
HUMAN PAPILLOMA VIRUS 16 DNA: NEGATIVE
HUMAN PAPILLOMA VIRUS 18 DNA: NEGATIVE
HUMAN PAPILLOMA VIRUS FINAL DIAGNOSIS: NORMAL
HUMAN PAPILLOMA VIRUS OTHER HR: NEGATIVE

## 2021-08-02 NOTE — TELEPHONE ENCOUNTER
Patient states that in the past when vitamin D was low she was prescribed the 50,000 units once week for several weeks. Patient is surprised that Dr. Catalan is not ordering this time? States that vitamin D level this time is lower than historically.     Please advise. Triage to call the patient back.    Pharmacy attached.    Can we leave a detailed message on this number? YES  Phone number patient can be reached at: Cell number on file:    Telephone Information:   Mobile [phone]       Shanta Finch RN  [facility] Triage

## 2021-08-02 NOTE — TELEPHONE ENCOUNTER
Patient given message from Dr. Catalan. Patient agrees with plan. She did not want to scheduled follow up at time of this call. States that she will call back. Shanta Finch RN

## 2021-08-02 NOTE — TELEPHONE ENCOUNTER
Called patient back, left message for pt to call back clinic and speak with RN. Also sent patient lab results via [employer].     Khushboo Ferreira RN

## 2021-08-02 NOTE — TELEPHONE ENCOUNTER
Pt calling and has a question about the Vitamin D lab results. Please call her back at [phone]. Thank you.  Edna Hermosillo,

## 2021-08-14 ENCOUNTER — HEALTH MAINTENANCE LETTER (OUTPATIENT)
Age: 46
End: 2021-08-14

## 2021-10-09 ENCOUNTER — HEALTH MAINTENANCE LETTER (OUTPATIENT)
Age: 46
End: 2021-10-09

## 2021-10-22 DIAGNOSIS — E55.9 VITAMIN D DEFICIENCY: ICD-10-CM

## 2021-10-22 NOTE — TELEPHONE ENCOUNTER
Failed protocol. Needs vitamin D rechecked    please route to  team if patient needs an appointment     Betina HERNANDEZRN BSN  [facility]  [phone]

## 2021-10-26 RX ORDER — METHOCARBAMOL 750 MG/1
TABLET ORAL
Qty: 12 CAPSULE | Refills: 0 | OUTPATIENT
Start: 2021-10-26

## 2021-10-26 NOTE — TELEPHONE ENCOUNTER
Remind pt to do follow up for med check and labs, since she is due. In the meantime she should just take OTC vitamin d ( 2000 units daily) cannot approve high dose taht she was taking until we check labs

## 2021-10-29 NOTE — TELEPHONE ENCOUNTER
Pt was called and feels like she should only have to come in every 6 months she was last seen in July. Writer told she would check with provider again but if the provider felt pt needed to be seen then we would call back to help assist pt getting scheduled pt stated understanding. Please advise if appropriate.

## 2021-11-01 NOTE — TELEPHONE ENCOUNTER
She needs to do f/u  labs after taking stronger prescription , bc we cannot continue such high dose for extended period of time. Order placed for lab     otherwise she can just then take OTC 3000 - 4000 OTC dose for maintaing it now

## 2021-11-05 NOTE — TELEPHONE ENCOUNTER
Patient Contact    Called patient and relayed provider message. Scheduled lab only appointment. No further action needed at this time.

## 2021-11-10 ENCOUNTER — LAB (OUTPATIENT)
Dept: LAB | Facility: CLINIC | Age: 46
End: 2021-11-10
Payer: COMMERCIAL

## 2021-11-10 DIAGNOSIS — E55.9 VITAMIN D DEFICIENCY: ICD-10-CM

## 2021-11-10 PROCEDURE — 36415 COLL VENOUS BLD VENIPUNCTURE: CPT

## 2021-11-10 PROCEDURE — 82306 VITAMIN D 25 HYDROXY: CPT

## 2021-11-11 LAB — DEPRECATED CALCIDIOL+CALCIFEROL SERPL-MC: 33 UG/L (ref 20–75)

## 2022-09-17 ENCOUNTER — HEALTH MAINTENANCE LETTER (OUTPATIENT)
Age: 47
End: 2022-09-17

## 2022-11-21 NOTE — TELEPHONE ENCOUNTER
Left non-detailed message to call the clinic back at [phone] and ask to speak with a  triage nurse. Shanta Finch RN       Cheek Interpolation Flap Text: A decision was made to reconstruct the defect utilizing an interpolation axial flap and a staged reconstruction.  A telfa template was made of the defect.  This telfa template was then used to outline the Cheek Interpolation flap.  The donor area for the pedicle flap was then injected with anesthesia.  The flap was excised through the skin and subcutaneous tissue down to the layer of the underlying musculature.  The interpolation flap was carefully excised within this deep plane to maintain its blood supply.  The edges of the donor site were undermined.   The donor site was closed in a primary fashion.  The pedicle was then rotated into position and sutured.  Once the tube was sutured into place, adequate blood supply was confirmed with blanching and refill.  The pedicle was then wrapped with xeroform gauze and dressed appropriately with a telfa and gauze bandage to ensure continued blood supply and protect the attached pedicle.

## 2023-01-30 ENCOUNTER — NURSE TRIAGE (OUTPATIENT)
Dept: FAMILY MEDICINE | Facility: CLINIC | Age: 48
End: 2023-01-30
Payer: COMMERCIAL

## 2023-01-30 NOTE — TELEPHONE ENCOUNTER
Nurse Triage SBAR    Is this a 2nd Level Triage? NO    Situation: Patient calling to report abnormal vaginal bleeding. Had a normal period 1/10. Started bleeding again 1/23 until now. States using about 2 pads in 24 hours which is normal for her. Dishcarge is different - pinkish-brown. Denies clots.  Patient has an IUD. She has had in IUD for 11 years.     Background: Patient has had IUD 11 years. Periods are usually very regular.     Assessment: Abnormal menstrual bleeding without abdominal pain.     Protocol Recommended Disposition:   See in Office Within 2 Weeks    Recommendation: Please advise if in clinic or OK for virtual. Patient prefers female provider.      Routed to provider    Does the patient meet one of the following criteria for ADS visit consideration? 16+ years old, with an [facility] PCP     TIP  Providers, please consider if this condition is appropriate for management at one of our Acute and Diagnostic Services sites.     If patient is a good candidate, please use dotphrase <dot>triageresponse and select Refer to ADS to document.      Reason for Disposition    Periods last > 7 days    Additional Information    Negative: SEVERE vaginal bleeding (e.g., continuous red blood from vagina, or large blood clots) and very weak (can't stand)    Negative: Passed out (i.e., fainted, collapsed and was not responding)    Negative: Difficult to awaken or acting confused (e.g., disoriented, slurred speech)    Negative: Shock suspected (e.g., cold/pale/clammy skin, too weak to stand, low BP, rapid pulse)    Negative: Sounds like a life-threatening emergency to the triager    Negative: Pregnant 20 or more weeks (5 months or more)    Negative: Pregnant < 20 weeks (less than 5 months)    Negative: Postpartum (from 0 to 6 weeks after delivery)    Negative: Vaginal discharge is main symptom and bleeding is slight    Negative: SEVERE abdominal pain (e.g., excruciating)    Negative: SEVERE dizziness (e.g., unable to stand,  "requires support to walk, feels like passing out now)    Negative: SEVERE vaginal bleeding (e.g., soaking 2 pads or tampons per hour and present 2 or more hours; 1 menstrual cup every 2 hours)    Negative: MODERATE vaginal bleeding (i.e., soaking pad or tampon per hour and present > 6 hours; 1 menstrual cup every 6 hours)    Negative: Pale skin (pallor) of new-onset or worsening    Negative: Constant abdominal pain lasting > 2 hours    Negative: Patient sounds very sick or weak to the triager    Negative: Taking Coumadin (warfarin) or other strong blood thinner, or known bleeding disorder (e.g., thrombocytopenia)    Negative: Skin bruises or nosebleed and not caused by an injury    Negative: Bleeding/spotting after procedure (e.g., biopsy) or pelvic examination (e.g., pap smear) that persists > 3 days    Negative: Patient wants to be seen    Negative: Passed tissue (e.g., gray-white)    Negative: Periods with > 6 soaked pads or tampons per day    Answer Assessment - Initial Assessment Questions  1. AMOUNT: \"Describe the bleeding that you are having.\"     - SPOTTING: spotting, or pinkish / brownish mucous discharge; does not fill panty liner or pad     - MILD:  less than 1 pad / hour; less than patient's usual menstrual bleeding    - MODERATE: 1-2 pads / hour; 1 menstrual cup every 6 hours; small-medium blood clots (e.g., pea, grape, small coin)    - SEVERE: soaking 2 or more pads/hour for 2 or more hours; 1 menstrual cup every 2 hours; bleeding not contained by pads or continuous red blood from vagina; large blood clots (e.g., golf ball, large coin)       Brown and pinkish. 1 pad for whole day and then one at night.   2. ONSET: \"When did the bleeding begin?\" \"Is it continuing now?\"      1/23 started and still bleeding. Had normal period on 1/10 that last 5 days.  3. MENSTRUAL PERIOD: \"When was the last normal menstrual period?\" \"How is this different than your period?\"      Color of discharge more pink and dark " "bown.Normal discharge is blood. Not passing clotts.   4. REGULARITY: \"How regular are your periods?\"      Before this periods were very regular.   5. ABDOMINAL PAIN: \"Do you have any pain?\" \"How bad is the pain?\"  (e.g., Scale 1-10; mild, moderate, or severe)    - MILD (1-3): doesn't interfere with normal activities, abdomen soft and not tender to touch     - MODERATE (4-7): interferes with normal activities or awakens from sleep, abdomen tender to touch     - SEVERE (8-10): excruciating pain, doubled over, unable to do any normal activities       0 pain  6. PREGNANCY: \"Could you be pregnant?\" \"Are you sexually active?\" \"Did you recently give birth?\"      Has IUD  7. BREASTFEEDING: \"Are you breastfeeding?\"      no  8. HORMONES: \"Are you taking any hormone medications, prescription or OTC?\" (e.g., birth control pills, estrogen)      IUD   9. BLOOD THINNERS: \"Do you take any blood thinners?\" (e.g., Coumadin/warfarin, Pradaxa/dabigatran, aspirin)      no  10. CAUSE: \"What do you think is causing the bleeding?\" (e.g., recent gyn surgery, recent gyn procedure; known bleeding disorder, cervical cancer, polycystic ovarian disease, fibroids)          Miriam menopause or IUD?  11. HEMODYNAMIC STATUS: \"Are you weak or feeling lightheaded?\" If Yes, ask: \"Can you stand and walk normally?\"         Sometimes feel a little week. Caring out normal activities/jobs. Stand and walk normally.  12. OTHER SYMPTOMS: \"What other symptoms are you having with the bleeding?\" (e.g., passed tissue, vaginal discharge, fever, menstrual-type cramps)        none    Protocols used: VAGINAL BLEEDING - [id number]    Shanta Finch RN    "

## 2023-02-01 NOTE — TELEPHONE ENCOUNTER
Patient Contact     S/w pt and relayed message from Dr. Catalan, see below. Nurse was able to find open in-person visit tomorrow and pt wanted to schedule this. No further questions at this time.    Damari ARVIZU RN  [facility]

## 2023-02-02 ENCOUNTER — OFFICE VISIT (OUTPATIENT)
Dept: FAMILY MEDICINE | Facility: CLINIC | Age: 48
End: 2023-02-02
Payer: COMMERCIAL

## 2023-02-02 VITALS
WEIGHT: 190 LBS | OXYGEN SATURATION: 98 % | TEMPERATURE: 97.9 F | RESPIRATION RATE: 16 BRPM | HEART RATE: 76 BPM | SYSTOLIC BLOOD PRESSURE: 114 MMHG | DIASTOLIC BLOOD PRESSURE: 70 MMHG | BODY MASS INDEX: 35.87 KG/M2 | HEIGHT: 61 IN

## 2023-02-02 DIAGNOSIS — E66.811 CLASS 1 OBESITY WITH BODY MASS INDEX (BMI) OF 34.0 TO 34.9 IN ADULT, UNSPECIFIED OBESITY TYPE, UNSPECIFIED WHETHER SERIOUS COMORBIDITY PRESENT: ICD-10-CM

## 2023-02-02 DIAGNOSIS — Z12.11 SCREEN FOR COLON CANCER: ICD-10-CM

## 2023-02-02 DIAGNOSIS — Z13.220 LIPID SCREENING: ICD-10-CM

## 2023-02-02 DIAGNOSIS — Z12.31 BREAST CANCER SCREENING BY MAMMOGRAM: ICD-10-CM

## 2023-02-02 DIAGNOSIS — N92.1 IRREGULAR INTERMENSTRUAL BLEEDING: Primary | ICD-10-CM

## 2023-02-02 DIAGNOSIS — Z13.1 SCREENING FOR DIABETES MELLITUS: ICD-10-CM

## 2023-02-02 DIAGNOSIS — Z12.31 VISIT FOR SCREENING MAMMOGRAM: ICD-10-CM

## 2023-02-02 LAB
CHOLEST SERPL-MCNC: 144 MG/DL
ERYTHROCYTE [DISTWIDTH] IN BLOOD BY AUTOMATED COUNT: 13 % (ref 10–15)
FASTING STATUS PATIENT QL REPORTED: YES
GLUCOSE SERPL-MCNC: 100 MG/DL (ref 70–99)
HCT VFR BLD AUTO: 39.9 % (ref 35–47)
HDLC SERPL-MCNC: 34 MG/DL
HGB BLD-MCNC: 13.3 G/DL (ref 11.7–15.7)
LDLC SERPL CALC-MCNC: 88 MG/DL
MCH RBC QN AUTO: 30.5 PG (ref 26.5–33)
MCHC RBC AUTO-ENTMCNC: 33.3 G/DL (ref 31.5–36.5)
MCV RBC AUTO: 92 FL (ref 78–100)
NONHDLC SERPL-MCNC: 110 MG/DL
PLATELET # BLD AUTO: 304 10E3/UL (ref 150–450)
RBC # BLD AUTO: 4.36 10E6/UL (ref 3.8–5.2)
TRIGL SERPL-MCNC: 108 MG/DL
TSH SERPL DL<=0.005 MIU/L-ACNC: 2.27 UIU/ML (ref 0.3–4.2)
WBC # BLD AUTO: 5.3 10E3/UL (ref 4–11)

## 2023-02-02 PROCEDURE — 84443 ASSAY THYROID STIM HORMONE: CPT | Performed by: FAMILY MEDICINE

## 2023-02-02 PROCEDURE — 80061 LIPID PANEL: CPT | Performed by: FAMILY MEDICINE

## 2023-02-02 PROCEDURE — 85027 COMPLETE CBC AUTOMATED: CPT | Performed by: FAMILY MEDICINE

## 2023-02-02 PROCEDURE — 99214 OFFICE O/P EST MOD 30 MIN: CPT | Performed by: FAMILY MEDICINE

## 2023-02-02 PROCEDURE — 36415 COLL VENOUS BLD VENIPUNCTURE: CPT | Performed by: FAMILY MEDICINE

## 2023-02-02 PROCEDURE — 82947 ASSAY GLUCOSE BLOOD QUANT: CPT | Performed by: FAMILY MEDICINE

## 2023-02-02 ASSESSMENT — PAIN SCALES - GENERAL: PAINLEVEL: NO PAIN (0)

## 2023-02-02 NOTE — PROGRESS NOTES
Assessment & Plan     (N92.1) Irregular intermenstrual bleeding  (primary encounter diagnosis)  Comment:   Plan: CBC with platelets, TSH with free T4 reflex          Discussed possible differential diagnosis for her symptoms likely hormonal changes vs other. This is the first episode and no previous issue.   Talked about potential perimenopause, stress, thyroid issues  etc. Also worried about if it IUD related although she has no pain and clinically exam is fine  Etc.            So reassurance  and observation for now.   She is comfortable watching for now.  will keep track of her cycle , if any worsening or frequent problem , will consider further evaluation if needed.    she also plans to schedule for her routine yearly physical soon         (Z12.11) Screen for colon cancer  Comment:   Plan: Adult GI  Referral - Procedure Only            (Z12.31) Visit for screening mammogram  Comment:   Plan: MA SCREENING DIGITAL BILAT - Future  (s+30)            (Z12.31) Breast cancer screening by mammogram  Comment:   Plan: MA SCREENING DIGITAL BILAT - Future  (s+30)            (E66.9,  Z68.34) Class 1 obesity with body mass index (BMI) of 34.0 to 34.9 in adult, unspecified obesity type, unspecified whether serious comorbidity present  Comment:   Plan:     (Z13.220) Lipid screening  Comment:   Plan: Lipid panel reflex to direct LDL Fasting            (Z13.1) Screening for diabetes mellitus  Comment:   Plan: Glucose          She is passed due for physical, so wish to proceed with fasting labs today also placed  order other orders but plans to schedule for her routine yearly physical soon    Check labs. t.Cares and concerns discussed.  follow up if problem   Patient expressed understanding and agreement with treatment plan. All patient's questions were answered, will let me know if has more later.             BMI:   Estimated body mass index is 35.9 kg/m  as calculated from the following:    Height as of this  "encounter: 1.549 m (5' 1\").    Weight as of this encounter: 86.2 kg (190 lb).   Weight management plan: Discussed healthy diet and exercise guidelines      Rosa Maria Catalan MD  [facility] ClinicLIZBETH Negrete is a 47 year old, presenting for the following health issues:  No chief complaint on file.      History of Present Illness       Back Pain:  She presents for follow up of back pain. Patient's back pain is a new problem.    Original cause of back pain: other  First noticed back pain: today  Patient feels back pain: rarelyLocation of back pain:  Other  Description of back pain: other  Back pain spreads: left side of neck    Since patient first noticed back pain, pain is: no longer a problem  Does back pain interfere with her job:  No  On a scale of 1-10 (10 being the worst), patient describes pain as:  1  What makes back pain worse: stress  Other healthcare providers patient is seeing for back pain: None    She eats 4 or more servings of fruits and vegetables daily.She consumes 2 sweetened beverage(s) daily. She exercises with enough effort to increase her heart rate 3 or less days per week.   She is taking medications regularly.            Menstrual Concern  Onset/Duration: Abnormal Bleeding . Last period in  December, second week was normal.  Her  usual  cycle is 30 days and last 5 days average.   So it felt ie a normal period last month . She then got her period 01/10/23 started on  her expected time, last for 5 day and then it was done, and then  another a week later, she  started bleeding again. Now it has been  10 days  and still going on. blood flow still feel like  normal flow and has been slowing down,  now today and it is less   She feels well otherwise. Unsure may have a felt a bit lightheaded earlier but not a recurrent problem.   Description: 10 days   Duration of bleeding episodes: 10 days  Frequency of periods: (1st day of one to 1st day of next):   1 week normal " bleeding then stop one week and then bleeding for 5 days aging   Describe bleeding/flow:   Clots: No  Number of pads/day: 10        Cramping: No  Accompanying Signs & Symptoms:  Lightheadedness: No  Temperature intolerance: No  Nosebleeds/Easy bruising: No  Vaginal Discharge: No  Acne: No  Change in body hair: No   weight change - has gained weight may be lack of exercise and paying attention to eating   History:  No LMP recorded.  Previous normal periods: YES  Contraceptive use: YES, IUD still I place - 9 yrs ago   Sexually active: YES  Any bleeding after intercourse: No  Abnormal PAP Smears: No  Precipitating or alleviating factors:   Therapies tried and outcome: None      She has been passed due for her routine physical but just wondering if she can have her fasting labs today, since she is fasting . She plans to schedule for her yearly check up soon once she has availability as she had to cancel her appointment earlier     Review of Systems   Constitutional, HEENT, cardiovascular, pulmonary, GI, , musculoskeletal, neuro, skin, endocrine and psych systems are negative, except as otherwise noted.      Objective    There were no vitals taken for this visit.  There is no height or weight on file to calculate BMI.  Physical Exam   GENERAL: healthy, alert and no distress  EYES: Eyes grossly normal to inspection  HENT: oropharynx clear and oral mucous membranes moist  NECK: no adenopathy, no asymmetry, masses, or scars and thyroid normal to palpation  RESP: lungs clear to auscultation - no rales, rhonchi or wheezes  CV: regular rate and rhythm, normal S1 S2, no S3 or S4,  ABDOMEN: soft, nontender, no hepatosplenomegaly, no masses and bowel sounds normal  MS: no edema  SKIN: no suspicious lesions or rashes  NEURO: Normal strength and tone, mentation intact and speech normal  PSYCH: mentation appears normal, affect normal/bright

## 2023-02-06 ENCOUNTER — VIRTUAL VISIT (OUTPATIENT)
Dept: URGENT CARE | Facility: CLINIC | Age: 48
End: 2023-02-06
Payer: COMMERCIAL

## 2023-02-06 ENCOUNTER — NURSE TRIAGE (OUTPATIENT)
Dept: FAMILY MEDICINE | Facility: CLINIC | Age: 48
End: 2023-02-06

## 2023-02-06 DIAGNOSIS — U07.1 CLINICAL DIAGNOSIS OF COVID-19: ICD-10-CM

## 2023-02-06 DIAGNOSIS — N39.0 URINARY TRACT INFECTION WITHOUT HEMATURIA, SITE UNSPECIFIED: Primary | ICD-10-CM

## 2023-02-06 PROCEDURE — 99213 OFFICE O/P EST LOW 20 MIN: CPT | Mod: CS

## 2023-02-06 RX ORDER — SULFAMETHOXAZOLE/TRIMETHOPRIM 800-160 MG
1 TABLET ORAL 2 TIMES DAILY
Qty: 10 TABLET | Refills: 0 | Status: SHIPPED | OUTPATIENT
Start: 2023-02-06 | End: 2023-02-11

## 2023-02-06 NOTE — TELEPHONE ENCOUNTER
Pt complains of dysuria, urinary frequency, hematuria and low back pain since yesterday. She also tested positive for COVID-19 today. Noticed a headache yesterday. Pt reports her symptoms are mild. Denies fever, chest pain or breathing problems. Pt meet CDC criteria for Paxlovid due to her weight. Triage advised VV to discuss both UTI symptoms and COVID-19 tx. VV was scheduled.     Reason for Disposition    Pain or burning with passing urine (urination) and female    Side (flank) or lower back pain present    Additional Information    Negative: Shock suspected (e.g., cold/pale/clammy skin, too weak to stand, low BP, rapid pulse)    Negative: Sounds like a life-threatening emergency to the triager    Negative: Shock suspected (e.g., cold/pale/clammy skin, too weak to stand, low BP, rapid pulse)    Negative: Sounds like a life-threatening emergency to the triager    Negative: Unable to urinate (or only a few drops) and bladder feels very full    Negative: Vomiting    Negative: Patient sounds very sick or weak to the triager    Negative: SEVERE pain with urination    Negative: Fever > 100.4 F (38.0 C)    Protocols used: URINARY SYMPTOMS-A-OH, URINATION PAIN - FEMALE-A-OH

## 2023-02-06 NOTE — PROGRESS NOTES
"Penelope is a 47 year old who is being evaluated via a billable phone visit.      Tested + 2/5.  No sx.   +.  Patient was asymptomatic.  Will only take Paxlovid if become mor symptomatic in next 2-3 days.  +dysuria this AM.  NO fever or flank pain.    Assessment & Plan     Clinical diagnosis of COVID-19    - nirmatrelvir and ritonavir (PAXLOVID) therapy pack; Take 3 tablets by mouth 2 times daily for 5 days (Take 2 Nirmatrelvir tablets and 1 Ritonavir tablet twice daily for 5 days)    Urinary tract infection without hematuria, site unspecified    - sulfamethoxazole-trimethoprim (BACTRIM DS) 800-160 MG tablet; Take 1 tablet by mouth 2 times daily for 5 [id number]}     COVID-19 positive patient.  Encounter for consideration of medication intervention. Patient does qualify for a prescription. Full discussion with patient including medication options, risks and benefits. Potential drug interactions reviewed with patient.     Treatment Planned Paxlovid RX sent to [facility]    Temporary change to home medications:  None     Estimated body mass index is 35.9 kg/m  as calculated from the following:    Height as of 2/2/23: 1.549 m (5' 1\").    Weight as of 2/2/23: 86.2 kg (190 lb).  GFR Estimate   Date Value Ref Range Status   02/07/2020 >90 >60 mL/min/[1.73_m2] Final     Comment:     Non  GFR Calc  Starting 12/18/2018, serum creatinine based estimated GFR (eGFR) will be   calculated using the Chronic Kidney Disease Epidemiology Collaboration   (CKD-EPI) equation.       Kavitha Ngo MD  Virtual Urgent Care  [facility] VIRTUAL URGENT CARE    Subjective   Penelope is a 47 year old, presenting for the following health issues:  No chief complaint on file.      HPI     Tested + 2/5.  No sx.   +.  Patient was asymptomatic.  +dysuria this AM.  NO fever or flank pain.      Review of Systems   Constitutional, HEENT, cardiovascular, pulmonary, GI, , musculoskeletal, neuro, skin, endocrine and " psych systems are negative, except as otherwise noted.      Objective           Vitals:  No vitals were obtained today due to virtual visit.    Physical Exam   GENERAL: Healthy, alert and no distress  PSYCH: mentation appears normal and affect normal/bright    Phone call duration # 10 minutes.

## 2023-02-06 NOTE — TELEPHONE ENCOUNTER
New Medication Request        What medication are you requesting?: Antibiotic     Reason for medication request: Burning while urining.    Have you taken this medication before?: No    Controlled Substance Agreement on file:   CSA -- Patient Level:    CSA: None found at the patient level.         Patient offered an appointment? Yes: telephone 2/7/23-Patient is COVID positive 2/6/23. Pt is requesting if an antibiotic can be sent in today.    Preferred Pharmacy:     [facility]/pharmacy #[id number] - [place] [address] [address]  Phone: [phone] Fax: [phone]      Could we send this information to you [facility] or would you prefer to receive a phone call?:   Patient would prefer a phone call   Okay to leave a detailed message?: Yes at Cell number on file:    Telephone Information:   Mobile [phone]

## 2023-02-21 ENCOUNTER — TELEPHONE (OUTPATIENT)
Dept: GASTROENTEROLOGY | Facility: CLINIC | Age: 48
End: 2023-02-21
Payer: COMMERCIAL

## 2023-02-21 ENCOUNTER — HOSPITAL ENCOUNTER (OUTPATIENT)
Facility: CLINIC | Age: 48
End: 2023-02-21
Attending: INTERNAL MEDICINE | Admitting: INTERNAL MEDICINE
Payer: COMMERCIAL

## 2023-02-21 DIAGNOSIS — Z12.11 SPECIAL SCREENING FOR MALIGNANT NEOPLASMS, COLON: Primary | ICD-10-CM

## 2023-02-21 NOTE — TELEPHONE ENCOUNTER
"    Screening Questions  BLUE  KIND OF PREP RED  LOCATION [review exclusion criteria] GREEN  SEDATION TYPE        y Are you active on mychart?       Hossein Ordering/Referring Provider?        [facility] What type of coverage do you have?      n Have you had a positive covid test in the last 14 days?     30.9 1. BMI  [BMI 40+ - review exclusion criteria]    y  2. Are you able to give consent for your medical care? [IF NO,RN REVIEW]          n  3. Are you taking any prescription pain medications on a routine schedule   (ex narcotics: oxycodone, roxicodone, oxycontin,  and percocet)? [RN Review]        n  3a. EXTENDED PREP What kind of prescription?     n 4. Do you have any chemical dependencies such as alcohol, street drugs, or methadone?        **If yes 3- 5 , please schedule with MAC sedation.**          IF YES TO ANY 6 - 10 - HOSPITAL SETTING ONLY.     n 6.   Do you need assistance transferring?     n 7.   Have you had a heart or lung transplant?    n 8.   Are you currently on dialysis?   n 9.   Do you use daily home oxygen?   n 10. Do you take nitroglycerin?   10a. n If yes, how often?     11. [FEMALES]  n Are you currently pregnant?    11a. n If yes, how many weeks? [ Greater than 12 weeks, OR NEEDED]    n 12. Do you have Pulmonary Hypertension? *NEED PAC APPT AT [facility] w/ MAC*     n 13. [review exclusion criteria]  Do you have any implantable devices in your body (pacemaker, defib, LVAD)?    n 14. In the past 6 months, have you had any heart related issues including cardiomyopathy or heart attack?     14a. n If yes, did it require cardiac stenting if so when?     n 15. Have you had a stroke or Transient ischemic attack (TIA - aka  mini stroke ) within 6 months?      n 16. Do you have mod to severe Obstructive Sleep Apnea?  [Hospital only]    n 17. Do you have SEVERE AND UNCONTROLLED asthma? *NEED PAC APPT AT [facility] w/MAC*     n 18. Are you currently taking any blood thinners?     18a. If yes, inform patient to \"follow up " "w/ ordering provider for bridging instructions.\"    n 19. Do you take the medication Phentermine?    19a. If yes, \"Hold for 7 days before procedure.  Please consult your prescribing provider if you have questions about holding this medication.\"     n  20. Do you have chronic kidney disease?      n  21. Do you have a diagnosis of diabetes?     n  22. On a regular basis do you go 3-5 days between bowel movements?      23. Preferred LOCAL Pharmacy for Pre Prescription    [ LIST ONLY ONE PHARMACY]     [facility]/PHARMACY #[id number] - [place], [place] - [address]        - CLOSING REMINDERS -    Informed patient they will need an adult    Cannot take any type of public or medical transportation alone    Conscious Sedation- Needs  for 6 hours after the procedure       MAC/General-Needs  for 24 hours after procedure    Pre-Procedure Covid test to be completed [[facility] PCR Testing Required]    Confirmed Nurse will call to complete assessment       - SCHEDULING DETAILS -  n Hospital Setting Required? If yes, what is the exclusion?:    Denzel  Surgeon    5/1/23  Date of Procedure  Lower Endoscopy [Colonoscopy]  Type of Procedure Scheduled  [facility] [place]ARD [facility]-If you answer yes to questions #8, #20, #21Which Colonoscopy Prep was Sent?     CS Sedation Type     n PAC / Pre-op Required                 "

## 2023-03-01 ENCOUNTER — ANCILLARY PROCEDURE (OUTPATIENT)
Dept: MAMMOGRAPHY | Facility: CLINIC | Age: 48
End: 2023-03-01
Payer: COMMERCIAL

## 2023-03-01 DIAGNOSIS — Z12.31 VISIT FOR SCREENING MAMMOGRAM: ICD-10-CM

## 2023-03-01 DIAGNOSIS — Z12.31 BREAST CANCER SCREENING BY MAMMOGRAM: ICD-10-CM

## 2023-03-01 PROCEDURE — 77067 SCR MAMMO BI INCL CAD: CPT | Mod: TC | Performed by: RADIOLOGY

## 2023-03-01 PROCEDURE — 77063 BREAST TOMOSYNTHESIS BI: CPT | Mod: TC | Performed by: RADIOLOGY

## 2023-04-25 RX ORDER — BISACODYL 5 MG/1
TABLET, DELAYED RELEASE ORAL
Qty: 4 TABLET | Refills: 0 | Status: SHIPPED | OUTPATIENT
Start: 2023-04-25 | End: 2023-05-01 | Stop reason: HOSPADM

## 2023-05-01 ENCOUNTER — TELEPHONE (OUTPATIENT)
Dept: GASTROENTEROLOGY | Facility: CLINIC | Age: 48
End: 2023-05-01
Payer: COMMERCIAL

## 2023-05-01 NOTE — TELEPHONE ENCOUNTER
Caller: Penelope  Reason for Reschedule/Cancellation (please be detailed, any staff messages or encounters to note?): Patient-not sure how or why it was canceled      Prior to reschedule please review:    Ordering Provider:Hossein    Sedation per order:CS    Does patient have any ASC Exclusions, please identify?: N      Notes on Cancelled Procedure:    Procedure:Lower Endoscopy [Colonoscopy]     Date: 5/1/23-previously canceled    Location:[facility]; [facility] [address] [place] [address]    Surgeon: Denzel        Rescheduled: No-patient wants a May date, but at this time, we do not have any May dates open.  Patient will call back to check on cancels for May    Case in depot

## 2023-10-07 ENCOUNTER — HEALTH MAINTENANCE LETTER (OUTPATIENT)
Age: 48
End: 2023-10-07

## 2024-11-30 ENCOUNTER — HEALTH MAINTENANCE LETTER (OUTPATIENT)
Age: 49
End: 2024-11-30

## 2025-05-17 ENCOUNTER — HEALTH MAINTENANCE LETTER (OUTPATIENT)
Age: 50
End: 2025-05-17